# Patient Record
Sex: FEMALE | Race: BLACK OR AFRICAN AMERICAN | NOT HISPANIC OR LATINO | URBAN - METROPOLITAN AREA
[De-identification: names, ages, dates, MRNs, and addresses within clinical notes are randomized per-mention and may not be internally consistent; named-entity substitution may affect disease eponyms.]

---

## 2017-01-01 ENCOUNTER — EMERGENCY (EMERGENCY)
Facility: HOSPITAL | Age: 82
LOS: 1 days | Discharge: HOME | End: 2017-01-01
Attending: EMERGENCY MEDICINE
Payer: MEDICARE

## 2017-01-01 ENCOUNTER — INPATIENT (INPATIENT)
Facility: HOSPITAL | Age: 82
LOS: 5 days | DRG: 388 | End: 2017-07-25
Attending: INTERNAL MEDICINE | Admitting: INTERNAL MEDICINE
Payer: MEDICARE

## 2017-01-01 VITALS
RESPIRATION RATE: 21 BRPM | HEART RATE: 62 BPM | DIASTOLIC BLOOD PRESSURE: 44 MMHG | OXYGEN SATURATION: 97 % | SYSTOLIC BLOOD PRESSURE: 107 MMHG

## 2017-01-01 VITALS
DIASTOLIC BLOOD PRESSURE: 50 MMHG | SYSTOLIC BLOOD PRESSURE: 80 MMHG | TEMPERATURE: 98 F | RESPIRATION RATE: 20 BRPM | OXYGEN SATURATION: 99 % | WEIGHT: 110.01 LBS | HEART RATE: 89 BPM

## 2017-01-01 VITALS
TEMPERATURE: 101 F | HEART RATE: 115 BPM | DIASTOLIC BLOOD PRESSURE: 59 MMHG | SYSTOLIC BLOOD PRESSURE: 91 MMHG | RESPIRATION RATE: 44 BRPM | OXYGEN SATURATION: 94 %

## 2017-01-01 VITALS
TEMPERATURE: 98 F | OXYGEN SATURATION: 95 % | RESPIRATION RATE: 16 BRPM | SYSTOLIC BLOOD PRESSURE: 200 MMHG | HEART RATE: 103 BPM | DIASTOLIC BLOOD PRESSURE: 102 MMHG | WEIGHT: 110.01 LBS | HEIGHT: 64 IN

## 2017-01-01 DIAGNOSIS — N17.9 ACUTE KIDNEY FAILURE, UNSPECIFIED: ICD-10-CM

## 2017-01-01 DIAGNOSIS — F03.90 UNSPECIFIED DEMENTIA, UNSPECIFIED SEVERITY, WITHOUT BEHAVIORAL DISTURBANCE, PSYCHOTIC DISTURBANCE, MOOD DISTURBANCE, AND ANXIETY: ICD-10-CM

## 2017-01-01 DIAGNOSIS — K31.9 DISEASE OF STOMACH AND DUODENUM, UNSPECIFIED: ICD-10-CM

## 2017-01-01 DIAGNOSIS — R11.10 VOMITING, UNSPECIFIED: ICD-10-CM

## 2017-01-01 DIAGNOSIS — S09.90XA UNSPECIFIED INJURY OF HEAD, INITIAL ENCOUNTER: ICD-10-CM
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
## 2017-01-01 DIAGNOSIS — I10 ESSENTIAL (PRIMARY) HYPERTENSION: ICD-10-CM

## 2017-01-01 DIAGNOSIS — K56.41 FECAL IMPACTION: ICD-10-CM
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
## 2017-01-01 DIAGNOSIS — Z29.9 ENCOUNTER FOR PROPHYLACTIC MEASURES, UNSPECIFIED: ICD-10-CM

## 2017-01-01 DIAGNOSIS — Z86.79 PERSONAL HISTORY OF OTHER DISEASES OF THE CIRCULATORY SYSTEM: ICD-10-CM

## 2017-01-01 DIAGNOSIS — K56.7 ILEUS, UNSPECIFIED: ICD-10-CM

## 2017-01-01 DIAGNOSIS — R53.1 WEAKNESS: ICD-10-CM

## 2017-01-01 DIAGNOSIS — K56.69 OTHER INTESTINAL OBSTRUCTION: ICD-10-CM

## 2017-01-01 DIAGNOSIS — E86.0 DEHYDRATION: ICD-10-CM

## 2017-01-01 LAB
-  AMIKACIN: SIGNIFICANT CHANGE UP
-  AMPICILLIN/SULBACTAM: SIGNIFICANT CHANGE UP
-  AMPICILLIN: SIGNIFICANT CHANGE UP
-  AZTREONAM: SIGNIFICANT CHANGE UP
-  CEFAZOLIN: SIGNIFICANT CHANGE UP
-  CEFEPIME: SIGNIFICANT CHANGE UP
-  CEFOXITIN: SIGNIFICANT CHANGE UP
-  CEFTAZIDIME: SIGNIFICANT CHANGE UP
-  CEFTRIAXONE: SIGNIFICANT CHANGE UP
-  CIPROFLOXACIN: SIGNIFICANT CHANGE UP
-  ERTAPENEM: SIGNIFICANT CHANGE UP
-  GENTAMICIN: SIGNIFICANT CHANGE UP
-  IMIPENEM: SIGNIFICANT CHANGE UP
-  LEVOFLOXACIN: SIGNIFICANT CHANGE UP
-  MEROPENEM: SIGNIFICANT CHANGE UP
-  NITROFURANTOIN: SIGNIFICANT CHANGE UP
-  PIPERACILLIN/TAZOBACTAM: SIGNIFICANT CHANGE UP
-  TOBRAMYCIN: SIGNIFICANT CHANGE UP
-  TRIMETHOPRIM/SULFAMETHOXAZOLE: SIGNIFICANT CHANGE UP
24R-OH-CALCIDIOL SERPL-MCNC: 19 NG/ML — LOW (ref 30–100)
ALBUMIN SERPL ELPH-MCNC: 2.2 G/DL — LOW (ref 3.5–5)
ALBUMIN SERPL ELPH-MCNC: 2.6 G/DL — LOW (ref 3.5–5)
ALBUMIN SERPL ELPH-MCNC: 3.2 G/DL — LOW (ref 3.5–5)
ALP SERPL-CCNC: 105 U/L — SIGNIFICANT CHANGE UP (ref 40–120)
ALP SERPL-CCNC: 127 U/L — HIGH (ref 40–120)
ALP SERPL-CCNC: 85 U/L — SIGNIFICANT CHANGE UP (ref 40–120)
ALT FLD-CCNC: 21 U/L DA — SIGNIFICANT CHANGE UP (ref 10–60)
ALT FLD-CCNC: 21 U/L DA — SIGNIFICANT CHANGE UP (ref 10–60)
ALT FLD-CCNC: 24 U/L DA — SIGNIFICANT CHANGE UP (ref 10–60)
ANION GAP SERPL CALC-SCNC: 10 MMOL/L — SIGNIFICANT CHANGE UP (ref 5–17)
ANION GAP SERPL CALC-SCNC: 10 MMOL/L — SIGNIFICANT CHANGE UP (ref 5–17)
ANION GAP SERPL CALC-SCNC: 13 MMOL/L — SIGNIFICANT CHANGE UP (ref 5–17)
ANION GAP SERPL CALC-SCNC: 6 MMOL/L — SIGNIFICANT CHANGE UP (ref 5–17)
ANION GAP SERPL CALC-SCNC: 7 MMOL/L — SIGNIFICANT CHANGE UP (ref 5–17)
ANION GAP SERPL CALC-SCNC: 8 MMOL/L — SIGNIFICANT CHANGE UP (ref 5–17)
ANION GAP SERPL CALC-SCNC: 9 MMOL/L — SIGNIFICANT CHANGE UP (ref 5–17)
APPEARANCE UR: CLEAR — SIGNIFICANT CHANGE UP
APPEARANCE UR: CLEAR — SIGNIFICANT CHANGE UP
APTT BLD: 29.6 SEC — SIGNIFICANT CHANGE UP (ref 27.5–37.4)
APTT BLD: 36.6 SEC — SIGNIFICANT CHANGE UP (ref 27.5–37.4)
AST SERPL-CCNC: 19 U/L — SIGNIFICANT CHANGE UP (ref 10–40)
AST SERPL-CCNC: 20 U/L — SIGNIFICANT CHANGE UP (ref 10–40)
AST SERPL-CCNC: 32 U/L — SIGNIFICANT CHANGE UP (ref 10–40)
BASE EXCESS BLDA CALC-SCNC: -1 MMOL/L — SIGNIFICANT CHANGE UP (ref -2–2)
BASOPHILS # BLD AUTO: 0.1 K/UL — SIGNIFICANT CHANGE UP (ref 0–0.2)
BASOPHILS # BLD AUTO: 0.1 K/UL — SIGNIFICANT CHANGE UP (ref 0–0.2)
BASOPHILS NFR BLD AUTO: 0.6 % — SIGNIFICANT CHANGE UP (ref 0–2)
BASOPHILS NFR BLD AUTO: 2.9 % — HIGH (ref 0–2)
BILIRUB SERPL-MCNC: 0.3 MG/DL — SIGNIFICANT CHANGE UP (ref 0.2–1.2)
BILIRUB SERPL-MCNC: 0.3 MG/DL — SIGNIFICANT CHANGE UP (ref 0.2–1.2)
BILIRUB SERPL-MCNC: 1.1 MG/DL — SIGNIFICANT CHANGE UP (ref 0.2–1.2)
BILIRUB UR-MCNC: NEGATIVE — SIGNIFICANT CHANGE UP
BILIRUB UR-MCNC: NEGATIVE — SIGNIFICANT CHANGE UP
BUN SERPL-MCNC: 10 MG/DL — SIGNIFICANT CHANGE UP (ref 7–18)
BUN SERPL-MCNC: 20 MG/DL — HIGH (ref 7–18)
BUN SERPL-MCNC: 27 MG/DL — HIGH (ref 7–18)
BUN SERPL-MCNC: 28 MG/DL — HIGH (ref 7–18)
BUN SERPL-MCNC: 31 MG/DL — HIGH (ref 7–18)
BUN SERPL-MCNC: 34 MG/DL — HIGH (ref 7–18)
BUN SERPL-MCNC: 45 MG/DL — HIGH (ref 7–18)
CALCIUM SERPL-MCNC: 10.3 MG/DL — SIGNIFICANT CHANGE UP (ref 8.4–10.5)
CALCIUM SERPL-MCNC: 8.8 MG/DL — SIGNIFICANT CHANGE UP (ref 8.4–10.5)
CALCIUM SERPL-MCNC: 9 MG/DL — SIGNIFICANT CHANGE UP (ref 8.4–10.5)
CALCIUM SERPL-MCNC: 9 MG/DL — SIGNIFICANT CHANGE UP (ref 8.4–10.5)
CALCIUM SERPL-MCNC: 9.1 MG/DL — SIGNIFICANT CHANGE UP (ref 8.4–10.5)
CALCIUM SERPL-MCNC: 9.2 MG/DL — SIGNIFICANT CHANGE UP (ref 8.4–10.5)
CALCIUM SERPL-MCNC: 9.4 MG/DL — SIGNIFICANT CHANGE UP (ref 8.4–10.5)
CHLORIDE SERPL-SCNC: 102 MMOL/L — SIGNIFICANT CHANGE UP (ref 96–108)
CHLORIDE SERPL-SCNC: 108 MMOL/L — SIGNIFICANT CHANGE UP (ref 96–108)
CHLORIDE SERPL-SCNC: 110 MMOL/L — HIGH (ref 96–108)
CHLORIDE SERPL-SCNC: 115 MMOL/L — HIGH (ref 96–108)
CHLORIDE SERPL-SCNC: 117 MMOL/L — HIGH (ref 96–108)
CHLORIDE SERPL-SCNC: 118 MMOL/L — HIGH (ref 96–108)
CHLORIDE SERPL-SCNC: 118 MMOL/L — HIGH (ref 96–108)
CHOLEST SERPL-MCNC: 155 MG/DL — SIGNIFICANT CHANGE UP (ref 10–199)
CO2 SERPL-SCNC: 21 MMOL/L — LOW (ref 22–31)
CO2 SERPL-SCNC: 22 MMOL/L — SIGNIFICANT CHANGE UP (ref 22–31)
CO2 SERPL-SCNC: 22 MMOL/L — SIGNIFICANT CHANGE UP (ref 22–31)
CO2 SERPL-SCNC: 23 MMOL/L — SIGNIFICANT CHANGE UP (ref 22–31)
CO2 SERPL-SCNC: 24 MMOL/L — SIGNIFICANT CHANGE UP (ref 22–31)
CO2 SERPL-SCNC: 25 MMOL/L — SIGNIFICANT CHANGE UP (ref 22–31)
CO2 SERPL-SCNC: 28 MMOL/L — SIGNIFICANT CHANGE UP (ref 22–31)
COLOR SPEC: YELLOW — SIGNIFICANT CHANGE UP
COLOR SPEC: YELLOW — SIGNIFICANT CHANGE UP
CREAT SERPL-MCNC: 0.7 MG/DL — SIGNIFICANT CHANGE UP (ref 0.5–1.3)
CREAT SERPL-MCNC: 0.82 MG/DL — SIGNIFICANT CHANGE UP (ref 0.5–1.3)
CREAT SERPL-MCNC: 0.86 MG/DL — SIGNIFICANT CHANGE UP (ref 0.5–1.3)
CREAT SERPL-MCNC: 1.25 MG/DL — SIGNIFICANT CHANGE UP (ref 0.5–1.3)
CREAT SERPL-MCNC: 1.27 MG/DL — SIGNIFICANT CHANGE UP (ref 0.5–1.3)
CREAT SERPL-MCNC: 1.55 MG/DL — HIGH (ref 0.5–1.3)
CREAT SERPL-MCNC: 2.4 MG/DL — HIGH (ref 0.5–1.3)
CULTURE RESULTS: SIGNIFICANT CHANGE UP
D DIMER BLD IA.RAPID-MCNC: HIGH NG/ML DDU
DIFF PNL FLD: ABNORMAL
DIFF PNL FLD: NEGATIVE — SIGNIFICANT CHANGE UP
EOSINOPHIL # BLD AUTO: 0 K/UL — SIGNIFICANT CHANGE UP (ref 0–0.5)
EOSINOPHIL # BLD AUTO: 0.3 K/UL — SIGNIFICANT CHANGE UP (ref 0–0.5)
EOSINOPHIL NFR BLD AUTO: 0 % — SIGNIFICANT CHANGE UP (ref 0–6)
EOSINOPHIL NFR BLD AUTO: 5.3 % — SIGNIFICANT CHANGE UP (ref 0–6)
FOLATE SERPL-MCNC: 7.1 NG/ML — SIGNIFICANT CHANGE UP (ref 4.8–24.2)
GLUCOSE SERPL-MCNC: 103 MG/DL — HIGH (ref 70–99)
GLUCOSE SERPL-MCNC: 123 MG/DL — HIGH (ref 70–99)
GLUCOSE SERPL-MCNC: 129 MG/DL — HIGH (ref 70–99)
GLUCOSE SERPL-MCNC: 133 MG/DL — HIGH (ref 70–99)
GLUCOSE SERPL-MCNC: 171 MG/DL — HIGH (ref 70–99)
GLUCOSE SERPL-MCNC: 239 MG/DL — HIGH (ref 70–99)
GLUCOSE SERPL-MCNC: 85 MG/DL — SIGNIFICANT CHANGE UP (ref 70–99)
GLUCOSE UR QL: NEGATIVE — SIGNIFICANT CHANGE UP
GLUCOSE UR QL: NEGATIVE — SIGNIFICANT CHANGE UP
HBA1C BLD-MCNC: 5.6 % — SIGNIFICANT CHANGE UP (ref 4–5.6)
HCO3 BLDA-SCNC: 21 MMOL/L — LOW (ref 23–27)
HCT VFR BLD CALC: 41 % — SIGNIFICANT CHANGE UP (ref 34.5–45)
HCT VFR BLD CALC: 41.4 % — SIGNIFICANT CHANGE UP (ref 34.5–45)
HCT VFR BLD CALC: 41.6 % — SIGNIFICANT CHANGE UP (ref 34.5–45)
HCT VFR BLD CALC: 43.2 % — SIGNIFICANT CHANGE UP (ref 34.5–45)
HCT VFR BLD CALC: 44.6 % — SIGNIFICANT CHANGE UP (ref 34.5–45)
HCT VFR BLD CALC: 46.3 % — HIGH (ref 34.5–45)
HCT VFR BLD CALC: 46.6 % — HIGH (ref 34.5–45)
HCT VFR BLD CALC: 48.9 % — HIGH (ref 34.5–45)
HCT VFR BLD CALC: 52.3 % — HIGH (ref 34.5–45)
HDLC SERPL-MCNC: 73 MG/DL — SIGNIFICANT CHANGE UP (ref 40–125)
HGB BLD-MCNC: 13.2 G/DL — SIGNIFICANT CHANGE UP (ref 11.5–15.5)
HGB BLD-MCNC: 13.4 G/DL — SIGNIFICANT CHANGE UP (ref 11.5–15.5)
HGB BLD-MCNC: 13.6 G/DL — SIGNIFICANT CHANGE UP (ref 11.5–15.5)
HGB BLD-MCNC: 14 G/DL — SIGNIFICANT CHANGE UP (ref 11.5–15.5)
HGB BLD-MCNC: 14.6 G/DL — SIGNIFICANT CHANGE UP (ref 11.5–15.5)
HGB BLD-MCNC: 14.8 G/DL — SIGNIFICANT CHANGE UP (ref 11.5–15.5)
HGB BLD-MCNC: 15.2 G/DL — SIGNIFICANT CHANGE UP (ref 11.5–15.5)
HGB BLD-MCNC: 15.3 G/DL — SIGNIFICANT CHANGE UP (ref 11.5–15.5)
HGB BLD-MCNC: 16.3 G/DL — HIGH (ref 11.5–15.5)
HOROWITZ INDEX BLDA+IHG-RTO: SIGNIFICANT CHANGE UP
INR BLD: 0.98 RATIO — SIGNIFICANT CHANGE UP (ref 0.88–1.16)
INR BLD: 1.11 RATIO — SIGNIFICANT CHANGE UP (ref 0.88–1.16)
KETONES UR-MCNC: NEGATIVE — SIGNIFICANT CHANGE UP
KETONES UR-MCNC: NEGATIVE — SIGNIFICANT CHANGE UP
LACTATE SERPL-SCNC: 1.5 MMOL/L — SIGNIFICANT CHANGE UP (ref 0.7–2)
LACTATE SERPL-SCNC: 2.1 MMOL/L — HIGH (ref 0.7–2)
LACTATE SERPL-SCNC: 3.3 MMOL/L — HIGH (ref 0.7–2)
LACTATE SERPL-SCNC: 3.9 MMOL/L — HIGH (ref 0.7–2)
LACTATE SERPL-SCNC: 4.8 MMOL/L — CRITICAL HIGH (ref 0.7–2)
LEUKOCYTE ESTERASE UR-ACNC: ABNORMAL
LEUKOCYTE ESTERASE UR-ACNC: NEGATIVE — SIGNIFICANT CHANGE UP
LIPID PNL WITH DIRECT LDL SERPL: 69 MG/DL — SIGNIFICANT CHANGE UP
LYMPHOCYTES # BLD AUTO: 0.9 K/UL — LOW (ref 1–3.3)
LYMPHOCYTES # BLD AUTO: 2 K/UL — SIGNIFICANT CHANGE UP (ref 1–3.3)
LYMPHOCYTES # BLD AUTO: 38.9 % — SIGNIFICANT CHANGE UP (ref 13–44)
LYMPHOCYTES # BLD AUTO: 6.2 % — LOW (ref 13–44)
MAGNESIUM SERPL-MCNC: 2.4 MG/DL — SIGNIFICANT CHANGE UP (ref 1.6–2.6)
MAGNESIUM SERPL-MCNC: 2.4 MG/DL — SIGNIFICANT CHANGE UP (ref 1.6–2.6)
MCHC RBC-ENTMCNC: 28.3 PG — SIGNIFICANT CHANGE UP (ref 27–34)
MCHC RBC-ENTMCNC: 28.3 PG — SIGNIFICANT CHANGE UP (ref 27–34)
MCHC RBC-ENTMCNC: 28.5 PG — SIGNIFICANT CHANGE UP (ref 27–34)
MCHC RBC-ENTMCNC: 28.6 PG — SIGNIFICANT CHANGE UP (ref 27–34)
MCHC RBC-ENTMCNC: 28.7 PG — SIGNIFICANT CHANGE UP (ref 27–34)
MCHC RBC-ENTMCNC: 28.7 PG — SIGNIFICANT CHANGE UP (ref 27–34)
MCHC RBC-ENTMCNC: 28.8 PG — SIGNIFICANT CHANGE UP (ref 27–34)
MCHC RBC-ENTMCNC: 28.9 PG — SIGNIFICANT CHANGE UP (ref 27–34)
MCHC RBC-ENTMCNC: 29.1 PG — SIGNIFICANT CHANGE UP (ref 27–34)
MCHC RBC-ENTMCNC: 31.2 GM/DL — LOW (ref 32–36)
MCHC RBC-ENTMCNC: 31.3 GM/DL — LOW (ref 32–36)
MCHC RBC-ENTMCNC: 31.9 GM/DL — LOW (ref 32–36)
MCHC RBC-ENTMCNC: 32 GM/DL — SIGNIFICANT CHANGE UP (ref 32–36)
MCHC RBC-ENTMCNC: 32.2 GM/DL — SIGNIFICANT CHANGE UP (ref 32–36)
MCHC RBC-ENTMCNC: 32.5 GM/DL — SIGNIFICANT CHANGE UP (ref 32–36)
MCHC RBC-ENTMCNC: 32.6 GM/DL — SIGNIFICANT CHANGE UP (ref 32–36)
MCHC RBC-ENTMCNC: 32.8 GM/DL — SIGNIFICANT CHANGE UP (ref 32–36)
MCHC RBC-ENTMCNC: 33.1 GM/DL — SIGNIFICANT CHANGE UP (ref 32–36)
MCV RBC AUTO: 88 FL — SIGNIFICANT CHANGE UP (ref 80–100)
MCV RBC AUTO: 88.1 FL — SIGNIFICANT CHANGE UP (ref 80–100)
MCV RBC AUTO: 88.2 FL — SIGNIFICANT CHANGE UP (ref 80–100)
MCV RBC AUTO: 88.2 FL — SIGNIFICANT CHANGE UP (ref 80–100)
MCV RBC AUTO: 88.8 FL — SIGNIFICANT CHANGE UP (ref 80–100)
MCV RBC AUTO: 89.3 FL — SIGNIFICANT CHANGE UP (ref 80–100)
MCV RBC AUTO: 89.6 FL — SIGNIFICANT CHANGE UP (ref 80–100)
MCV RBC AUTO: 90.4 FL — SIGNIFICANT CHANGE UP (ref 80–100)
MCV RBC AUTO: 90.5 FL — SIGNIFICANT CHANGE UP (ref 80–100)
METHOD TYPE: SIGNIFICANT CHANGE UP
MONOCYTES # BLD AUTO: 0.6 K/UL — SIGNIFICANT CHANGE UP (ref 0–0.9)
MONOCYTES # BLD AUTO: 1.2 K/UL — HIGH (ref 0–0.9)
MONOCYTES NFR BLD AUTO: 12.2 % — SIGNIFICANT CHANGE UP (ref 2–14)
MONOCYTES NFR BLD AUTO: 8.2 % — SIGNIFICANT CHANGE UP (ref 2–14)
NEUTROPHILS # BLD AUTO: 12.4 K/UL — HIGH (ref 1.8–7.4)
NEUTROPHILS # BLD AUTO: 2.1 K/UL — SIGNIFICANT CHANGE UP (ref 1.8–7.4)
NEUTROPHILS NFR BLD AUTO: 40.8 % — LOW (ref 43–77)
NEUTROPHILS NFR BLD AUTO: 85.1 % — HIGH (ref 43–77)
NITRITE UR-MCNC: NEGATIVE — SIGNIFICANT CHANGE UP
NITRITE UR-MCNC: POSITIVE
OB PNL STL: NEGATIVE — SIGNIFICANT CHANGE UP
ORGANISM # SPEC MICROSCOPIC CNT: SIGNIFICANT CHANGE UP
ORGANISM # SPEC MICROSCOPIC CNT: SIGNIFICANT CHANGE UP
PCO2 BLDA: 28 MMHG — LOW (ref 32–46)
PH BLDA: 7.49 — HIGH (ref 7.35–7.45)
PH UR: 5 — SIGNIFICANT CHANGE UP (ref 5–8)
PH UR: 8 — SIGNIFICANT CHANGE UP (ref 4.8–8)
PHOSPHATE SERPL-MCNC: 2.5 MG/DL — SIGNIFICANT CHANGE UP (ref 2.5–4.5)
PHOSPHATE SERPL-MCNC: 2.7 MG/DL — SIGNIFICANT CHANGE UP (ref 2.5–4.5)
PLATELET # BLD AUTO: 125 K/UL — LOW (ref 150–400)
PLATELET # BLD AUTO: 131 K/UL — LOW (ref 150–400)
PLATELET # BLD AUTO: 144 K/UL — LOW (ref 150–400)
PLATELET # BLD AUTO: 148 K/UL — LOW (ref 150–400)
PLATELET # BLD AUTO: 150 K/UL — SIGNIFICANT CHANGE UP (ref 150–400)
PLATELET # BLD AUTO: 156 K/UL — SIGNIFICANT CHANGE UP (ref 150–400)
PLATELET # BLD AUTO: 161 K/UL — SIGNIFICANT CHANGE UP (ref 150–400)
PLATELET # BLD AUTO: 166 K/UL — SIGNIFICANT CHANGE UP (ref 150–400)
PLATELET # BLD AUTO: 170 K/UL — SIGNIFICANT CHANGE UP (ref 150–400)
PO2 BLDA: 82 MMHG — SIGNIFICANT CHANGE UP (ref 74–108)
POTASSIUM SERPL-MCNC: 3.6 MMOL/L — SIGNIFICANT CHANGE UP (ref 3.5–5.3)
POTASSIUM SERPL-MCNC: 4.1 MMOL/L — SIGNIFICANT CHANGE UP (ref 3.5–5.3)
POTASSIUM SERPL-MCNC: 4.2 MMOL/L — SIGNIFICANT CHANGE UP (ref 3.5–5.3)
POTASSIUM SERPL-MCNC: 4.4 MMOL/L — SIGNIFICANT CHANGE UP (ref 3.5–5.3)
POTASSIUM SERPL-MCNC: 4.5 MMOL/L — SIGNIFICANT CHANGE UP (ref 3.5–5.3)
POTASSIUM SERPL-MCNC: 4.9 MMOL/L — SIGNIFICANT CHANGE UP (ref 3.5–5.3)
POTASSIUM SERPL-MCNC: 4.9 MMOL/L — SIGNIFICANT CHANGE UP (ref 3.5–5.3)
POTASSIUM SERPL-SCNC: 3.6 MMOL/L — SIGNIFICANT CHANGE UP (ref 3.5–5.3)
POTASSIUM SERPL-SCNC: 4.1 MMOL/L — SIGNIFICANT CHANGE UP (ref 3.5–5.3)
POTASSIUM SERPL-SCNC: 4.2 MMOL/L — SIGNIFICANT CHANGE UP (ref 3.5–5.3)
POTASSIUM SERPL-SCNC: 4.4 MMOL/L — SIGNIFICANT CHANGE UP (ref 3.5–5.3)
POTASSIUM SERPL-SCNC: 4.5 MMOL/L — SIGNIFICANT CHANGE UP (ref 3.5–5.3)
POTASSIUM SERPL-SCNC: 4.9 MMOL/L — SIGNIFICANT CHANGE UP (ref 3.5–5.3)
POTASSIUM SERPL-SCNC: 4.9 MMOL/L — SIGNIFICANT CHANGE UP (ref 3.5–5.3)
PROT SERPL-MCNC: 6.8 G/DL — SIGNIFICANT CHANGE UP (ref 6–8.3)
PROT SERPL-MCNC: 7.2 G/DL — SIGNIFICANT CHANGE UP (ref 6–8.3)
PROT SERPL-MCNC: 8.5 G/DL — HIGH (ref 6–8.3)
PROT UR-MCNC: 30 MG/DL
PROT UR-MCNC: NEGATIVE — SIGNIFICANT CHANGE UP
PROTHROM AB SERPL-ACNC: 10.7 SEC — SIGNIFICANT CHANGE UP (ref 9.8–12.7)
PROTHROM AB SERPL-ACNC: 12.1 SEC — SIGNIFICANT CHANGE UP (ref 9.8–12.7)
RBC # BLD: 4.61 M/UL — SIGNIFICANT CHANGE UP (ref 3.8–5.2)
RBC # BLD: 4.66 M/UL — SIGNIFICANT CHANGE UP (ref 3.8–5.2)
RBC # BLD: 4.66 M/UL — SIGNIFICANT CHANGE UP (ref 3.8–5.2)
RBC # BLD: 4.9 M/UL — SIGNIFICANT CHANGE UP (ref 3.8–5.2)
RBC # BLD: 5.06 M/UL — SIGNIFICANT CHANGE UP (ref 3.8–5.2)
RBC # BLD: 5.21 M/UL — HIGH (ref 3.8–5.2)
RBC # BLD: 5.28 M/UL — HIGH (ref 3.8–5.2)
RBC # BLD: 5.41 M/UL — HIGH (ref 3.8–5.2)
RBC # BLD: 5.78 M/UL — HIGH (ref 3.8–5.2)
RBC # FLD: 13.4 % — SIGNIFICANT CHANGE UP (ref 10.3–14.5)
RBC # FLD: 14.3 % — SIGNIFICANT CHANGE UP (ref 10.3–14.5)
RBC # FLD: 14.5 % — SIGNIFICANT CHANGE UP (ref 10.3–14.5)
RBC # FLD: 14.5 % — SIGNIFICANT CHANGE UP (ref 10.3–14.5)
RBC # FLD: 14.6 % — HIGH (ref 10.3–14.5)
RBC # FLD: 14.6 % — HIGH (ref 10.3–14.5)
RBC # FLD: 14.7 % — HIGH (ref 10.3–14.5)
RBC # FLD: 14.8 % — HIGH (ref 10.3–14.5)
RBC # FLD: 14.9 % — HIGH (ref 10.3–14.5)
SAO2 % BLDA: 96 % — SIGNIFICANT CHANGE UP (ref 92–96)
SODIUM SERPL-SCNC: 139 MMOL/L — SIGNIFICANT CHANGE UP (ref 135–145)
SODIUM SERPL-SCNC: 142 MMOL/L — SIGNIFICANT CHANGE UP (ref 135–145)
SODIUM SERPL-SCNC: 142 MMOL/L — SIGNIFICANT CHANGE UP (ref 135–145)
SODIUM SERPL-SCNC: 145 MMOL/L — SIGNIFICANT CHANGE UP (ref 135–145)
SODIUM SERPL-SCNC: 148 MMOL/L — HIGH (ref 135–145)
SODIUM SERPL-SCNC: 149 MMOL/L — HIGH (ref 135–145)
SODIUM SERPL-SCNC: 151 MMOL/L — HIGH (ref 135–145)
SP GR SPEC: 1.01 — SIGNIFICANT CHANGE UP (ref 1.01–1.02)
SP GR SPEC: 1.02 — SIGNIFICANT CHANGE UP (ref 1.01–1.02)
SPECIMEN SOURCE: SIGNIFICANT CHANGE UP
TOTAL CHOLESTEROL/HDL RATIO MEASUREMENT: 2.1 RATIO — LOW (ref 3.3–7.1)
TRIGL SERPL-MCNC: 63 MG/DL — SIGNIFICANT CHANGE UP (ref 10–149)
TSH SERPL-MCNC: 1.29 UU/ML — SIGNIFICANT CHANGE UP (ref 0.34–4.82)
UROBILINOGEN FLD QL: 1
UROBILINOGEN FLD QL: NEGATIVE — SIGNIFICANT CHANGE UP
VIT B12 SERPL-MCNC: >2000 PG/ML — HIGH (ref 243–894)
WBC # BLD: 13 K/UL — HIGH (ref 3.8–10.5)
WBC # BLD: 14.4 K/UL — HIGH (ref 3.8–10.5)
WBC # BLD: 14.6 K/UL — HIGH (ref 3.8–10.5)
WBC # BLD: 19.5 K/UL — HIGH (ref 3.8–10.5)
WBC # BLD: 5.1 K/UL — SIGNIFICANT CHANGE UP (ref 3.8–10.5)
WBC # BLD: 5.8 K/UL — SIGNIFICANT CHANGE UP (ref 3.8–10.5)
WBC # BLD: 7.3 K/UL — SIGNIFICANT CHANGE UP (ref 3.8–10.5)
WBC # BLD: 8.5 K/UL — SIGNIFICANT CHANGE UP (ref 3.8–10.5)
WBC # BLD: 9.9 K/UL — SIGNIFICANT CHANGE UP (ref 3.8–10.5)
WBC # FLD AUTO: 13 K/UL — HIGH (ref 3.8–10.5)
WBC # FLD AUTO: 14.4 K/UL — HIGH (ref 3.8–10.5)
WBC # FLD AUTO: 14.6 K/UL — HIGH (ref 3.8–10.5)
WBC # FLD AUTO: 19.5 K/UL — HIGH (ref 3.8–10.5)
WBC # FLD AUTO: 5.1 K/UL — SIGNIFICANT CHANGE UP (ref 3.8–10.5)
WBC # FLD AUTO: 5.8 K/UL — SIGNIFICANT CHANGE UP (ref 3.8–10.5)
WBC # FLD AUTO: 7.3 K/UL — SIGNIFICANT CHANGE UP (ref 3.8–10.5)
WBC # FLD AUTO: 8.5 K/UL — SIGNIFICANT CHANGE UP (ref 3.8–10.5)
WBC # FLD AUTO: 9.9 K/UL — SIGNIFICANT CHANGE UP (ref 3.8–10.5)

## 2017-01-01 PROCEDURE — 74020: CPT

## 2017-01-01 PROCEDURE — 74020: CPT | Mod: 26,77

## 2017-01-01 PROCEDURE — 72125 CT NECK SPINE W/O DYE: CPT

## 2017-01-01 PROCEDURE — 99285 EMERGENCY DEPT VISIT HI MDM: CPT

## 2017-01-01 PROCEDURE — 99285 EMERGENCY DEPT VISIT HI MDM: CPT | Mod: 25

## 2017-01-01 PROCEDURE — 36000 PLACE NEEDLE IN VEIN: CPT

## 2017-01-01 PROCEDURE — 83605 ASSAY OF LACTIC ACID: CPT

## 2017-01-01 PROCEDURE — 87086 URINE CULTURE/COLONY COUNT: CPT

## 2017-01-01 PROCEDURE — 80048 BASIC METABOLIC PNL TOTAL CA: CPT

## 2017-01-01 PROCEDURE — 99284 EMERGENCY DEPT VISIT MOD MDM: CPT

## 2017-01-01 PROCEDURE — 76857 US EXAM PELVIC LIMITED: CPT

## 2017-01-01 PROCEDURE — 71010: CPT | Mod: 26

## 2017-01-01 PROCEDURE — 82306 VITAMIN D 25 HYDROXY: CPT

## 2017-01-01 PROCEDURE — 80061 LIPID PANEL: CPT

## 2017-01-01 PROCEDURE — 82607 VITAMIN B-12: CPT

## 2017-01-01 PROCEDURE — 85027 COMPLETE CBC AUTOMATED: CPT

## 2017-01-01 PROCEDURE — 74018 RADEX ABDOMEN 1 VIEW: CPT

## 2017-01-01 PROCEDURE — 82803 BLOOD GASES ANY COMBINATION: CPT

## 2017-01-01 PROCEDURE — 92610 EVALUATE SWALLOWING FUNCTION: CPT

## 2017-01-01 PROCEDURE — 70450 CT HEAD/BRAIN W/O DYE: CPT | Mod: 26

## 2017-01-01 PROCEDURE — 81003 URINALYSIS AUTO W/O SCOPE: CPT

## 2017-01-01 PROCEDURE — 74176 CT ABD & PELVIS W/O CONTRAST: CPT

## 2017-01-01 PROCEDURE — 80053 COMPREHEN METABOLIC PANEL: CPT

## 2017-01-01 PROCEDURE — 87186 SC STD MICRODIL/AGAR DIL: CPT

## 2017-01-01 PROCEDURE — 86900 BLOOD TYPING SEROLOGIC ABO: CPT

## 2017-01-01 PROCEDURE — 93005 ELECTROCARDIOGRAM TRACING: CPT

## 2017-01-01 PROCEDURE — 87040 BLOOD CULTURE FOR BACTERIA: CPT

## 2017-01-01 PROCEDURE — 86901 BLOOD TYPING SEROLOGIC RH(D): CPT

## 2017-01-01 PROCEDURE — 84100 ASSAY OF PHOSPHORUS: CPT

## 2017-01-01 PROCEDURE — 71045 X-RAY EXAM CHEST 1 VIEW: CPT

## 2017-01-01 PROCEDURE — 84443 ASSAY THYROID STIM HORMONE: CPT

## 2017-01-01 PROCEDURE — 36415 COLL VENOUS BLD VENIPUNCTURE: CPT

## 2017-01-01 PROCEDURE — 74020: CPT | Mod: 26

## 2017-01-01 PROCEDURE — 71250 CT THORAX DX C-: CPT

## 2017-01-01 PROCEDURE — 76775 US EXAM ABDO BACK WALL LIM: CPT | Mod: 26

## 2017-01-01 PROCEDURE — 71250 CT THORAX DX C-: CPT | Mod: 26

## 2017-01-01 PROCEDURE — 74000: CPT | Mod: 26

## 2017-01-01 PROCEDURE — 82272 OCCULT BLD FECES 1-3 TESTS: CPT

## 2017-01-01 PROCEDURE — 85730 THROMBOPLASTIN TIME PARTIAL: CPT

## 2017-01-01 PROCEDURE — 72125 CT NECK SPINE W/O DYE: CPT | Mod: 26

## 2017-01-01 PROCEDURE — 83735 ASSAY OF MAGNESIUM: CPT

## 2017-01-01 PROCEDURE — 76775 US EXAM ABDO BACK WALL LIM: CPT

## 2017-01-01 PROCEDURE — 85610 PROTHROMBIN TIME: CPT

## 2017-01-01 PROCEDURE — 81001 URINALYSIS AUTO W/SCOPE: CPT

## 2017-01-01 PROCEDURE — 94640 AIRWAY INHALATION TREATMENT: CPT

## 2017-01-01 PROCEDURE — 74176 CT ABD & PELVIS W/O CONTRAST: CPT | Mod: 26

## 2017-01-01 PROCEDURE — 83036 HEMOGLOBIN GLYCOSYLATED A1C: CPT

## 2017-01-01 PROCEDURE — 99222 1ST HOSP IP/OBS MODERATE 55: CPT

## 2017-01-01 PROCEDURE — 86850 RBC ANTIBODY SCREEN: CPT

## 2017-01-01 PROCEDURE — 71010: CPT | Mod: 26,77

## 2017-01-01 PROCEDURE — 70450 CT HEAD/BRAIN W/O DYE: CPT

## 2017-01-01 PROCEDURE — 85379 FIBRIN DEGRADATION QUANT: CPT

## 2017-01-01 PROCEDURE — 82746 ASSAY OF FOLIC ACID SERUM: CPT

## 2017-01-01 RX ORDER — SENNA PLUS 8.6 MG/1
0 TABLET ORAL
Qty: 0 | Refills: 0 | COMMUNITY

## 2017-01-01 RX ORDER — PIPERACILLIN AND TAZOBACTAM 4; .5 G/20ML; G/20ML
3.38 INJECTION, POWDER, LYOPHILIZED, FOR SOLUTION INTRAVENOUS ONCE
Qty: 0 | Refills: 0 | Status: COMPLETED | OUTPATIENT
Start: 2017-01-01 | End: 2017-01-01

## 2017-01-01 RX ORDER — DONEPEZIL HYDROCHLORIDE 10 MG/1
5 TABLET, FILM COATED ORAL AT BEDTIME
Qty: 0 | Refills: 0 | Status: DISCONTINUED | OUTPATIENT
Start: 2017-01-01 | End: 2017-01-01

## 2017-01-01 RX ORDER — METOPROLOL TARTRATE 50 MG
25 TABLET ORAL
Qty: 0 | Refills: 0 | Status: DISCONTINUED | OUTPATIENT
Start: 2017-01-01 | End: 2017-01-01

## 2017-01-01 RX ORDER — HEPARIN SODIUM 5000 [USP'U]/ML
5000 INJECTION INTRAVENOUS; SUBCUTANEOUS EVERY 8 HOURS
Qty: 0 | Refills: 0 | Status: DISCONTINUED | OUTPATIENT
Start: 2017-01-01 | End: 2017-01-01

## 2017-01-01 RX ORDER — PANTOPRAZOLE SODIUM 20 MG/1
40 TABLET, DELAYED RELEASE ORAL EVERY 12 HOURS
Qty: 0 | Refills: 0 | Status: DISCONTINUED | OUTPATIENT
Start: 2017-01-01 | End: 2017-01-01

## 2017-01-01 RX ORDER — METOCLOPRAMIDE HCL 10 MG
10 TABLET ORAL ONCE
Qty: 0 | Refills: 0 | Status: COMPLETED | OUTPATIENT
Start: 2017-01-01 | End: 2017-01-01

## 2017-01-01 RX ORDER — ACETAMINOPHEN 500 MG
650 TABLET ORAL EVERY 6 HOURS
Qty: 0 | Refills: 0 | Status: DISCONTINUED | OUTPATIENT
Start: 2017-01-01 | End: 2017-01-01

## 2017-01-01 RX ORDER — PANTOPRAZOLE SODIUM 20 MG/1
40 TABLET, DELAYED RELEASE ORAL
Qty: 0 | Refills: 0 | Status: DISCONTINUED | OUTPATIENT
Start: 2017-01-01 | End: 2017-01-01

## 2017-01-01 RX ORDER — METOPROLOL TARTRATE 50 MG
2.5 TABLET ORAL EVERY 12 HOURS
Qty: 0 | Refills: 0 | Status: DISCONTINUED | OUTPATIENT
Start: 2017-01-01 | End: 2017-01-01

## 2017-01-01 RX ORDER — SODIUM CHLORIDE 9 MG/ML
500 INJECTION INTRAMUSCULAR; INTRAVENOUS; SUBCUTANEOUS ONCE
Qty: 0 | Refills: 0 | Status: DISCONTINUED | OUTPATIENT
Start: 2017-01-01 | End: 2017-01-01

## 2017-01-01 RX ORDER — IPRATROPIUM/ALBUTEROL SULFATE 18-103MCG
3 AEROSOL WITH ADAPTER (GRAM) INHALATION EVERY 6 HOURS
Qty: 0 | Refills: 0 | Status: DISCONTINUED | OUTPATIENT
Start: 2017-01-01 | End: 2017-01-01

## 2017-01-01 RX ORDER — MINERAL OIL
133 OIL (ML) MISCELLANEOUS ONCE
Qty: 0 | Refills: 0 | Status: COMPLETED | OUTPATIENT
Start: 2017-01-01 | End: 2017-01-01

## 2017-01-01 RX ORDER — SODIUM CHLORIDE 9 MG/ML
1000 INJECTION, SOLUTION INTRAVENOUS
Qty: 0 | Refills: 0 | Status: DISCONTINUED | OUTPATIENT
Start: 2017-01-01 | End: 2017-01-01

## 2017-01-01 RX ORDER — VANCOMYCIN HCL 1 G
1000 VIAL (EA) INTRAVENOUS ONCE
Qty: 0 | Refills: 0 | Status: COMPLETED | OUTPATIENT
Start: 2017-01-01 | End: 2017-01-01

## 2017-01-01 RX ORDER — SODIUM CHLORIDE 9 MG/ML
500 INJECTION INTRAMUSCULAR; INTRAVENOUS; SUBCUTANEOUS ONCE
Qty: 0 | Refills: 0 | Status: COMPLETED | OUTPATIENT
Start: 2017-01-01 | End: 2017-01-01

## 2017-01-01 RX ORDER — MEMANTINE HYDROCHLORIDE 10 MG/1
10 TABLET ORAL DAILY
Qty: 0 | Refills: 0 | Status: DISCONTINUED | OUTPATIENT
Start: 2017-01-01 | End: 2017-01-01

## 2017-01-01 RX ORDER — METOPROLOL TARTRATE 50 MG
2.5 TABLET ORAL ONCE
Qty: 0 | Refills: 0 | Status: COMPLETED | OUTPATIENT
Start: 2017-01-01 | End: 2017-01-01

## 2017-01-01 RX ORDER — TIOTROPIUM BROMIDE 18 UG/1
1 CAPSULE ORAL; RESPIRATORY (INHALATION) DAILY
Qty: 0 | Refills: 0 | Status: DISCONTINUED | OUTPATIENT
Start: 2017-01-01 | End: 2017-01-01

## 2017-01-01 RX ORDER — SODIUM CHLORIDE 9 MG/ML
1000 INJECTION INTRAMUSCULAR; INTRAVENOUS; SUBCUTANEOUS ONCE
Qty: 0 | Refills: 0 | Status: COMPLETED | OUTPATIENT
Start: 2017-01-01 | End: 2017-01-01

## 2017-01-01 RX ORDER — PIPERACILLIN AND TAZOBACTAM 4; .5 G/20ML; G/20ML
3.38 INJECTION, POWDER, LYOPHILIZED, FOR SOLUTION INTRAVENOUS EVERY 8 HOURS
Qty: 0 | Refills: 0 | Status: DISCONTINUED | OUTPATIENT
Start: 2017-01-01 | End: 2017-01-01

## 2017-01-01 RX ORDER — MEMANTINE HYDROCHLORIDE 10 MG/1
0 TABLET ORAL
Qty: 0 | Refills: 0 | COMMUNITY

## 2017-01-01 RX ORDER — SODIUM CHLORIDE 9 MG/ML
1000 INJECTION INTRAMUSCULAR; INTRAVENOUS; SUBCUTANEOUS
Qty: 0 | Refills: 0 | Status: DISCONTINUED | OUTPATIENT
Start: 2017-01-01 | End: 2017-01-01

## 2017-01-01 RX ORDER — MAGNESIUM HYDROXIDE 400 MG/1
30 TABLET, CHEWABLE ORAL
Qty: 0 | Refills: 0 | COMMUNITY

## 2017-01-01 RX ORDER — METOPROLOL TARTRATE 50 MG
5 TABLET ORAL ONCE
Qty: 0 | Refills: 0 | Status: COMPLETED | OUTPATIENT
Start: 2017-01-01 | End: 2017-01-01

## 2017-01-01 RX ORDER — ALBUTEROL 90 UG/1
1 AEROSOL, METERED ORAL EVERY 4 HOURS
Qty: 0 | Refills: 0 | Status: DISCONTINUED | OUTPATIENT
Start: 2017-01-01 | End: 2017-01-01

## 2017-01-01 RX ORDER — PANTOPRAZOLE SODIUM 20 MG/1
8 TABLET, DELAYED RELEASE ORAL
Qty: 80 | Refills: 0 | Status: DISCONTINUED | OUTPATIENT
Start: 2017-01-01 | End: 2017-01-01

## 2017-01-01 RX ORDER — METOPROLOL TARTRATE 50 MG
1 TABLET ORAL
Qty: 0 | Refills: 0 | COMMUNITY

## 2017-01-01 RX ORDER — METOPROLOL TARTRATE 50 MG
5 TABLET ORAL EVERY 12 HOURS
Qty: 0 | Refills: 0 | Status: DISCONTINUED | OUTPATIENT
Start: 2017-01-01 | End: 2017-01-01

## 2017-01-01 RX ORDER — ONDANSETRON 8 MG/1
4 TABLET, FILM COATED ORAL ONCE
Qty: 0 | Refills: 0 | Status: COMPLETED | OUTPATIENT
Start: 2017-01-01 | End: 2017-01-01

## 2017-01-01 RX ORDER — SODIUM CHLORIDE 9 MG/ML
1000 INJECTION, SOLUTION INTRAVENOUS
Qty: 0 | Refills: 0 | Status: COMPLETED | OUTPATIENT
Start: 2017-01-01 | End: 2017-01-01

## 2017-01-01 RX ADMIN — PANTOPRAZOLE SODIUM 10 MG/HR: 20 TABLET, DELAYED RELEASE ORAL at 01:45

## 2017-01-01 RX ADMIN — Medication 1.25 MILLIGRAM(S): at 17:32

## 2017-01-01 RX ADMIN — HEPARIN SODIUM 5000 UNIT(S): 5000 INJECTION INTRAVENOUS; SUBCUTANEOUS at 06:04

## 2017-01-01 RX ADMIN — PANTOPRAZOLE SODIUM 40 MILLIGRAM(S): 20 TABLET, DELAYED RELEASE ORAL at 17:23

## 2017-01-01 RX ADMIN — DONEPEZIL HYDROCHLORIDE 5 MILLIGRAM(S): 10 TABLET, FILM COATED ORAL at 23:26

## 2017-01-01 RX ADMIN — PANTOPRAZOLE SODIUM 40 MILLIGRAM(S): 20 TABLET, DELAYED RELEASE ORAL at 05:08

## 2017-01-01 RX ADMIN — Medication 250 MILLIGRAM(S): at 23:45

## 2017-01-01 RX ADMIN — Medication 1.25 MILLIGRAM(S): at 17:23

## 2017-01-01 RX ADMIN — SODIUM CHLORIDE 50 MILLILITER(S): 9 INJECTION, SOLUTION INTRAVENOUS at 21:59

## 2017-01-01 RX ADMIN — Medication 1.25 MILLIGRAM(S): at 02:31

## 2017-01-01 RX ADMIN — Medication 2.5 MILLIGRAM(S): at 10:50

## 2017-01-01 RX ADMIN — SODIUM CHLORIDE 1000 MILLILITER(S): 9 INJECTION INTRAMUSCULAR; INTRAVENOUS; SUBCUTANEOUS at 22:27

## 2017-01-01 RX ADMIN — Medication 2.5 MILLIGRAM(S): at 05:50

## 2017-01-01 RX ADMIN — Medication 10 MILLIGRAM(S): at 18:23

## 2017-01-01 RX ADMIN — DONEPEZIL HYDROCHLORIDE 5 MILLIGRAM(S): 10 TABLET, FILM COATED ORAL at 21:40

## 2017-01-01 RX ADMIN — SODIUM CHLORIDE 50 MILLILITER(S): 9 INJECTION, SOLUTION INTRAVENOUS at 10:51

## 2017-01-01 RX ADMIN — Medication 1.25 MILLIGRAM(S): at 12:47

## 2017-01-01 RX ADMIN — PANTOPRAZOLE SODIUM 40 MILLIGRAM(S): 20 TABLET, DELAYED RELEASE ORAL at 06:04

## 2017-01-01 RX ADMIN — Medication 1.25 MILLIGRAM(S): at 17:33

## 2017-01-01 RX ADMIN — Medication 5 MILLIGRAM(S): at 06:04

## 2017-01-01 RX ADMIN — SODIUM CHLORIDE 4000 MILLILITER(S): 9 INJECTION INTRAMUSCULAR; INTRAVENOUS; SUBCUTANEOUS at 00:28

## 2017-01-01 RX ADMIN — Medication 133 MILLILITER(S): at 13:00

## 2017-01-01 RX ADMIN — Medication 1.25 MILLIGRAM(S): at 11:52

## 2017-01-01 RX ADMIN — Medication 1.25 MILLIGRAM(S): at 01:41

## 2017-01-01 RX ADMIN — PIPERACILLIN AND TAZOBACTAM 200 GRAM(S): 4; .5 INJECTION, POWDER, LYOPHILIZED, FOR SOLUTION INTRAVENOUS at 23:45

## 2017-01-01 RX ADMIN — Medication 1.25 MILLIGRAM(S): at 18:23

## 2017-01-01 RX ADMIN — MEMANTINE HYDROCHLORIDE 10 MILLIGRAM(S): 10 TABLET ORAL at 11:54

## 2017-01-01 RX ADMIN — Medication 3 MILLILITER(S): at 21:40

## 2017-01-01 RX ADMIN — SODIUM CHLORIDE 50 MILLILITER(S): 9 INJECTION, SOLUTION INTRAVENOUS at 05:53

## 2017-01-01 RX ADMIN — Medication 1.25 MILLIGRAM(S): at 17:10

## 2017-01-01 RX ADMIN — Medication 5 MILLIGRAM(S): at 05:08

## 2017-01-01 RX ADMIN — MEMANTINE HYDROCHLORIDE 10 MILLIGRAM(S): 10 TABLET ORAL at 11:59

## 2017-01-01 RX ADMIN — ONDANSETRON 4 MILLIGRAM(S): 8 TABLET, FILM COATED ORAL at 00:28

## 2017-01-01 RX ADMIN — SODIUM CHLORIDE 50 MILLILITER(S): 9 INJECTION, SOLUTION INTRAVENOUS at 12:47

## 2017-01-01 RX ADMIN — PANTOPRAZOLE SODIUM 40 MILLIGRAM(S): 20 TABLET, DELAYED RELEASE ORAL at 17:32

## 2017-01-01 RX ADMIN — PANTOPRAZOLE SODIUM 40 MILLIGRAM(S): 20 TABLET, DELAYED RELEASE ORAL at 17:33

## 2017-01-01 RX ADMIN — Medication 5 MILLIGRAM(S): at 17:32

## 2017-01-01 RX ADMIN — PANTOPRAZOLE SODIUM 10 MG/HR: 20 TABLET, DELAYED RELEASE ORAL at 10:25

## 2017-01-01 RX ADMIN — Medication 2.5 MILLIGRAM(S): at 05:24

## 2017-01-01 RX ADMIN — Medication 2.5 MILLIGRAM(S): at 17:10

## 2017-01-01 RX ADMIN — SODIUM CHLORIDE 50 MILLILITER(S): 9 INJECTION, SOLUTION INTRAVENOUS at 23:26

## 2017-01-01 RX ADMIN — SODIUM CHLORIDE 50 MILLILITER(S): 9 INJECTION, SOLUTION INTRAVENOUS at 11:53

## 2017-01-01 RX ADMIN — SODIUM CHLORIDE 500 MILLILITER(S): 9 INJECTION INTRAMUSCULAR; INTRAVENOUS; SUBCUTANEOUS at 12:16

## 2017-01-01 RX ADMIN — SODIUM CHLORIDE 4000 MILLILITER(S): 9 INJECTION INTRAMUSCULAR; INTRAVENOUS; SUBCUTANEOUS at 03:25

## 2017-01-01 RX ADMIN — PANTOPRAZOLE SODIUM 40 MILLIGRAM(S): 20 TABLET, DELAYED RELEASE ORAL at 17:10

## 2017-01-01 RX ADMIN — Medication 25 MILLIGRAM(S): at 06:05

## 2017-01-01 RX ADMIN — Medication 5 MILLIGRAM(S): at 17:24

## 2017-01-01 RX ADMIN — Medication 650 MILLIGRAM(S): at 22:27

## 2017-01-01 RX ADMIN — Medication 5 MILLIGRAM(S): at 17:33

## 2017-01-01 RX ADMIN — PANTOPRAZOLE SODIUM 40 MILLIGRAM(S): 20 TABLET, DELAYED RELEASE ORAL at 05:53

## 2017-01-01 RX ADMIN — Medication 5 MILLIGRAM(S): at 01:38

## 2017-01-01 RX ADMIN — SODIUM CHLORIDE 50 MILLILITER(S): 9 INJECTION, SOLUTION INTRAVENOUS at 17:11

## 2017-01-01 RX ADMIN — Medication 1.25 MILLIGRAM(S): at 10:03

## 2017-01-01 RX ADMIN — Medication 1.25 MILLIGRAM(S): at 11:30

## 2017-01-01 RX ADMIN — Medication 1.25 MILLIGRAM(S): at 02:59

## 2017-01-01 RX ADMIN — SODIUM CHLORIDE 50 MILLILITER(S): 9 INJECTION, SOLUTION INTRAVENOUS at 01:46

## 2017-01-01 RX ADMIN — Medication 1 ENEMA: at 11:10

## 2017-02-07 NOTE — ED PROVIDER NOTE - MEDICAL DECISION MAKING DETAILS
89 F not on anticoagulants, s/p fall w/ minor frontal head trauma. No focal neuro deficits appreciated however given age CT-head and Ct-neck to eval for ICH or fracture. Hypotensive in triage. Evaluate for source. Dehydration vs occult infection. Give small IV fluid bolus and reassess.

## 2017-02-07 NOTE — ED PROVIDER NOTE - NS ED MD SCRIBE ATTENDING SCRIBE SECTIONS
HIV/PAST MEDICAL/SURGICAL/SOCIAL HISTORY/DISPOSITION/VITAL SIGNS( Pullset)/HISTORY OF PRESENT ILLNESS/PHYSICAL EXAM/REVIEW OF SYSTEMS

## 2017-02-07 NOTE — ED PROVIDER NOTE - DETAILS:
The scribe's documentation has been prepared under my direction and personally reviewed by me in its entirety. I confirm that the note above accurately reflects all work, treatment, procedures, and medical decision making performed by me (Dr. Silva).

## 2017-02-07 NOTE — ED PROVIDER NOTE - OBJECTIVE STATEMENT
89 y/o F w/ PMHx of dementia and HTN BIB NH p/w evaluation for fall onset today. Pt endorses positive head trauma and notes generalized weakness for past few days. Pt denies CP, HA, stomach pain, decreased PO intake, LOC, cough or any other complaint. NKDA. Hx and ROS limited by dementia status. Pt is DNR.

## 2017-02-07 NOTE — ED PROVIDER NOTE - PHYSICAL EXAMINATION
MSK: Full ROM at neck, nontender over posterior midline at neck. Motor 4/5 in all 4 extremities. LE mildly contracted. Distal sensory grossly intact. Nontender over back, no ecchymosis. Pelvis stable.

## 2017-05-03 NOTE — ED PROVIDER NOTE - CPE EDP HEAD NORM PED
Head atraumatic, normal cephalic shape.
I will STOP taking the medications listed below when I get home from the hospital:    Plavix 75 mg oral tablet  -- 1 tab(s) by mouth once a day    Zocor 10 mg oral tablet  -- 1 tab(s) by mouth once a day (at bedtime)    Synthroid 100 mcg (0.1 mg) oral tablet  -- 1 tab(s) by mouth once a day    Flonase 50 mcg/inh nasal spray  -- 1 spray(s) into nose once a day    labetalol 100 mg oral tablet  -- 1 tab(s) by mouth 2 times a day    tamsulosin 0.4 mg oral capsule  -- 1 cap(s) by mouth once a day    Pepcid 20 mg oral tablet  --  by mouth once a day    loratadine 10 mg oral capsule  -- 1 cap(s) by mouth once a day    Urecholine 5 mg oral tablet  -- 1 tab(s) by mouth 2 times a day    lactulose 10 g/15 mL oral syrup  -- 30 milliliter(s) by mouth every other day    Ceftin 250 mg oral tablet  -- 1 tab(s) by mouth 2 times a day

## 2017-07-20 NOTE — H&P ADULT - HISTORY OF PRESENT ILLNESS
Limited history. Patient is a poor historian. No family at bedside.    89yo F with pmhx of HTN, hyperlipidemia, parkison's, and gastritis came from nursing home after 3 episodes of emesis. She c/o LLQ pain which is crampy. Denies fevers, chills, or nausea. Denies chest pain or SOB. Denies dysuria or hematuria. Denies change in BM.    In the ED patient was afebrile, tachy- 103, and hypertensive 200/102. Received 5mg IV metoprolol and 2L NS bolus. No leukocytosis. She had high lactate of 4.8. FOBT negative.

## 2017-07-20 NOTE — ED ADULT NURSE REASSESSMENT NOTE - NS ED NURSE REASSESS COMMENT FT1
Patient remains in no acute distress, medicated as per order for elevated blood pressure. Patient has been repositioned multiple times for comfort. Family member by bedside. Fall precautions in place.

## 2017-07-20 NOTE — CONSULT NOTE ADULT - PROBLEM SELECTOR RECOMMENDATION 9
1- npo  2- recc NGT, pt currently refusing  3- iv hydration  4- enemas  5- out of bed if at all possible  6- AXR in am 7/21  7- d/w Dr Le and agrees

## 2017-07-20 NOTE — H&P ADULT - NSHPREVIEWOFSYSTEMS_GEN_ALL_CORE
Limited ROS:  CONSTITUTIONAL: No fever, weight loss, or fatigue  NECK: No pain or stiffness  RESPIRATORY: No cough, wheezing, chills or hemoptysis; No shortness of breath  CARDIOVASCULAR: No chest pain, palpitations, dizziness, or leg swelling  GASTROINTESTINAL: LLQ pain, nausea, and vomiting. No diarrhea or constipation. No melena or hematochezia.  GENITOURINARY: No dysuria, frequency, hematuria, or incontinence  MUSCULOSKELETAL: No joint pain or swelling; No muscle, back, or extremity pain

## 2017-07-20 NOTE — H&P ADULT - ATTENDING COMMENTS
chart reviewed, patient seen and seen and examined, above noted, agree with management.  will d/w HCP.

## 2017-07-20 NOTE — CONSULT NOTE ADULT - SUBJECTIVE AND OBJECTIVE BOX
HPI:  Limited history. Patient is a poor historian. No family at bedside.    89yo F with pmhx of HTN, hyperlipidemia, parkison's, and gastritis came from nursing home after 3 episodes of emesis. She c/o LLQ pain which is crampy. Denies fevers, chills, or nausea. Denies chest pain or SOB. Denies dysuria or hematuria. Denies change in BM.    Today pt is quiet, and comfortable. Has no complaints of abd pain, nausea or vomitting. Lactate on admission was 4, and is now 1.5 with adequate hydration. 1 documented stool yesterday, and she states she is occasionally having bowel movements    Vital Signs Last 24 Hrs  T(C): 36.8 (20 Jul 2017 13:17), Max: 37.3 (20 Jul 2017 03:14)  T(F): 98.3 (20 Jul 2017 13:17), Max: 99.1 (20 Jul 2017 03:14)  HR: 89 (20 Jul 2017 13:17) (84 - 103)  BP: 173/79 (20 Jul 2017 13:17) (173/79 - 200/102)  BP(mean): --  RR: 17 (20 Jul 2017 13:17) (16 - 18)  SpO2: 95% (20 Jul 2017 13:17) (94% - 99%)    Physical:  General: legally blind, follows commands, and responds appropriately  Abd: Distended. Tympanic. no peritoneal signs noted. nontender, no gaurding/rebound/rigidity                          15.2   14.6  )-----------( 166      ( 20 Jul 2017 09:56 )             46.6   07-20    142  |  110<H>  |  34<H>  ----------------------------<  171<H>  4.9   |  22  |  1.25    Ca    9.2      20 Jul 2017 09:56    TPro  7.2  /  Alb  2.6<L>  /  TBili  0.3  /  DBili  x   /  AST  19  /  ALT  21  /  AlkPhos  105  07-20    lactate 1.5    CT: Abd/Pelvis:  Large hiatal hernia.    Ascites in the abdomen with extension through the hiatus.    Diffuse small bowel dilatation without a focal transition point   identified. Finding may represent small bowel obstruction or ileus.    Large amount of stool in the rectum, compatible with fecal impaction.

## 2017-07-20 NOTE — H&P ADULT - PROBLEM SELECTOR PLAN 1
Patient likely has gastritis vs SBO vs diverticulitis. Received 2L bolus in ED  - F/u CT a/p  - Zofran prn nausea or vomiting  - repeat lactate  - NS 100ml/hr Patient likely has gastritis vs SBO vs diverticulitis. Received 2L bolus in ED  - F/u CT a/p  - Zofran prn nausea or vomiting  - repeat lactate  -  NPO  - Dextrose 5%@ 100cc/hr

## 2017-07-20 NOTE — H&P ADULT - NSHPPHYSICALEXAM_GEN_ALL_CORE
Vital Signs Last 24 Hrs  T(C): 37.3 (20 Jul 2017 03:14), Max: 37.3 (20 Jul 2017 03:14)  T(F): 99.1 (20 Jul 2017 03:14), Max: 99.1 (20 Jul 2017 03:14)  HR: 94 (20 Jul 2017 03:14) (92 - 103)  BP: 187/85 (20 Jul 2017 03:14) (187/85 - 200/102)  RR: 16 (20 Jul 2017 03:14) (16 - 18)  SpO2: 97% (20 Jul 2017 03:14) (95% - 99%)    GENERAL: NAD, lyng comfortably in bed  NECK: Supple  NERVOUS SYSTEM:  Alert   CHEST/LUNG: Clear to auscultation bilaterally; No rales, rhonchi, wheezing, or rubs  HEART: Regular rate and rhythm; No murmurs, rubs, or gallops  ABDOMEN: Soft, Nontender, Nondistended, no gaurding; Bowel sounds present  EXTREMITIES:  2+ Peripheral Pulses, No clubbing, cyanosis, or edema  LYMPH: No lymphadenopathy noted  SKIN: No rashes or lesions

## 2017-07-20 NOTE — CHART NOTE - NSCHARTNOTEFT_GEN_A_CORE
PGY-2 Varsha Valverde and I spoke with pt's daughter and family members at bedside at length today. Goals of care (DNR/DNI) were discussed including all potential risks vs. benefits. Pt's daughter (healthcare proxy) decided NO DNR/DNI at this time. I informed Dr. Oropeza this update as pt previously was DNR. Pt is full code. PGY-2 Varsha Valverde and I spoke with pt's daughter and family members at bedside at length today. Goals of care (DNR/DNI) were discussed including all potential risks vs. benefits. Pt's daughter (healthcare proxy) decided NO DNR/DNI at this time. I informed Dr. Oropeza this update as pt previously was DNR. Pt is full code.    PGY-2 Dr. Valverde and Dr. Wong and PGY-1 tried to advance NGT 5 times today; however, pt is constricting pharyngeal muscles and NGT can't be advance. Dr. Oropeza is notified.

## 2017-07-20 NOTE — ED PROVIDER NOTE - ENMT, MLM
Airway patent, Nasal mucosa clear. Mouth with normal mucosa. Throat has no vesicles, no oropharyngeal exudates and uvula is midline. Blind

## 2017-07-20 NOTE — H&P ADULT - ASSESSMENT
89yo F with h/o HTN, HLD came in from NH after 3 episodes of vomiting and c/o LLQ pain. She is afebrile, no leukocytosis and no guarding or tenderness on exam. Unlikely an infectious etiology. Will defer antiobiotics for now pending CT a/p.

## 2017-07-20 NOTE — ED PROVIDER NOTE - PMH
Anemia    Dementia    Gastro-esophageal reflux disease without esophagitis    History of hypertension

## 2017-07-20 NOTE — H&P ADULT - PROBLEM SELECTOR PLAN 2
Dehydration likely secondary to vomiting  - NS@100   - CTM Dehydration likely secondary to vomiting  - dextrose 5%@ 100 cc/hr   - CTM

## 2017-07-20 NOTE — H&P ADULT - NSHPLABSRESULTS_GEN_ALL_CORE
16.3   9.9   )-----------( 161      ( 20 Jul 2017 00:24 )             52.3     07-20    139  |  102  |  28<H>  ----------------------------<  239<H>  4.9   |  24  |  1.55<H>    Ca    10.3      20 Jul 2017 00:24    TPro  8.5<H>  /  Alb  3.2<L>  /  TBili  0.3  /  DBili  x   /  AST  20  /  ALT  21  /  AlkPhos  127<H>  07-20    Lactate, Blood (07.20.17 @ 00:24)    Lactate, Blood: 4.8    Lactate, Blood (07.20.17 @ 02:26)    Lactate, Blood: 3.9 mmol/L

## 2017-07-20 NOTE — ED PROVIDER NOTE - CARE PLAN
Principal Discharge DX:	Dehydration  Secondary Diagnosis:	Intractable vomiting with nausea, unspecified vomiting type

## 2017-07-20 NOTE — CHART NOTE - NSCHARTNOTEFT_GEN_A_CORE
I tried calling pt's daughter (healthcare proxy) Aretha Olvera [phone] (cell) - no one picked up and voice mail is full. Will try again later to clarify status for DNR/DNI.

## 2017-07-21 NOTE — CHART NOTE - NSCHARTNOTEFT_GEN_A_CORE
Upon Nutritional Assessment by the Registered Dietitian your patient was determined to meet criteria / has evidence of the following diagnosis/diagnoses:          [ ]  Mild Protein Calorie Malnutrition        [ ]  Moderate Protein Calorie Malnutrition        [ X ] Severe Protein Calorie Malnutrition        [ ] Unspecified Protein Calorie Malnutrition        [ X ] Underweight / BMI <19        [ ] Morbid Obesity / BMI > 40      Findings as based on:  •  Comprehensive nutrition assessment and consultation  •  Calorie counts (nutrient intake analysis)  •  Food acceptance and intake status from observations by staff  •  Follow up  •  Patient education  •  Intervention secondary to interdisciplinary rounds  •   concerns      Treatment:    The following diet has been recommended: Advance diet or consider alternate nutrition support if NPO prolonged further       PROVIDER Section:     By signing this assessment you are acknowledging and agree with the diagnosis/diagnoses assigned by the Registered Dietitian    Comments:

## 2017-07-21 NOTE — DIETITIAN INITIAL EVALUATION ADULT. - ADHERENCE
diet Rx at nursing home: 2g Na, puree, no bread/sandwich, Ensure Pudding 1can bid, 2cal HN 1can tid/n/a

## 2017-07-21 NOTE — CONSULT NOTE ADULT - PROBLEM SELECTOR RECOMMENDATION 3
- As per CT scan diffuse small bowel dilatation without a focal transition point may represent small bowel obstruction or ileus, findings likely due to fecal impaction.   - C/w NGT , IVF, monitor the electrolytes - As per CT scan diffuse small bowel dilatation without a focal transition point may represent small bowel obstruction or ileus, findings likely due to fecal impaction.   - Surgical evaluation recommended for NGT , tried by primary team yesterday but patient is refusing .   - C/w IVF, monitor the electrolytes, recommend for NGT.

## 2017-07-21 NOTE — PROGRESS NOTE ADULT - SUBJECTIVE AND OBJECTIVE BOX
PGY 1 Note discussed with supervising resident and primary attending    Patient is a 89y old  Female who presents with a chief complaint of Vomiting (2017 04:27)      INTERVAL HPI/OVERNIGHT EVENTS: Pt was seen and examined at bedside. I spoke with pt's daughter (healthcare proxy) today and updated her with pt's progress. Pt has no new complaints.    MEDICATIONS  (STANDING):  sodium chloride 0.9% Bolus 500 milliLiter(s) IV Bolus once  donepezil 5 milliGRAM(s) Oral at bedtime  memantine 10 milliGRAM(s) Oral daily  metoprolol Injectable 2.5 milliGRAM(s) IV Push every 12 hours  enalaprilat Injectable 1.25 milliGRAM(s) IV Push every 8 hours  pantoprazole  Injectable 40 milliGRAM(s) IV Push every 12 hours  dextrose 5% + sodium chloride 0.9%. 1000 milliLiter(s) (50 mL/Hr) IV Continuous <Continuous>    MEDICATIONS  (PRN):      __________________________________________________  REVIEW OF SYSTEMS:    EYES: legally blind  RESPIRATORY: No cough; No shortness of breath  CARDIOVASCULAR: No chest pain, no palpitations  GASTROINTESTINAL: No pain. No nausea or vomiting; 2 BM overnight  ALL OTHER ROS negative        Vital Signs Last 24 Hrs  T(C): 37.2 (2017 14:30), Max: 37.2 (2017 14:30)  T(F): 99 (2017 14:30), Max: 99 (2017 14:30)  HR: 96 (2017 14:30) (92 - 104)  BP: 177/79 (2017 14:30) (143/76 - 177/79)  BP(mean): --  RR: 17 (2017 14:30) (17 - 18)  SpO2: 94% (2017 14:30) (93% - 96%)    ________________________________________________  PHYSICAL EXAM:  GENERAL: NAD. Thin. Bed-bound  HEENT: Normocephalic;  conjunctivae and sclerae clear; moist mucous membranes;   NECK : supple  CHEST/LUNG: Clear to auscultation bilaterally with good air entry   HEART: S1 S2  regular; no murmurs, gallops or rubs  ABDOMEN: Mildly distended, soft; Bowel sounds present  EXTREMITIES: no cyanosis; no edema; no calf tenderness; peripheral pulses intact  SKIN: warm and dry; no rash  NERVOUS SYSTEM:  Awake and alert    _________________________________________________  LABS:                        14.6   14.4  )-----------( 150      ( 2017 10:48 )             44.6     -    145  |  115<H>  |  27<H>  ----------------------------<  123<H>  4.4   |  23  |  0.86    Ca    9.0      2017 10:48  Phos  2.5     -  Mg     2.4         TPro  7.2  /  Alb  2.6<L>  /  TBili  0.3  /  DBili  x   /  AST  19  /  ALT  21  /  AlkPhos  105  -    PT/INR - ( 2017 00:24 )   PT: 10.7 sec;   INR: 0.98 ratio         PTT - ( 2017 00:24 )  PTT:36.6 sec  Urinalysis Basic - ( 2017 02:47 )    Color: Yellow / Appearance: Clear / S.025 / pH: x  Gluc: x / Ketone: Negative  / Bili: Negative / Urobili: 1   Blood: x / Protein: 30 mg/dL / Nitrite: Positive   Leuk Esterase: Trace / RBC: 2-5 /HPF / WBC 3-5 /HPF   Sq Epi: x / Non Sq Epi: Few / Bacteria: Moderate /HPF        RADIOLOGY & ADDITIONAL TESTS:  Xray Abdomen - IMPRESSION:   Persistent small bowel ileus versus partial small bowel obstruction - not   significantly changed compared with 17 CT scan. Recommend short   interval (3-6hrs) plain film followup to reassess. At that time cross   sectional imaging can be done.    Imaging Personally Reviewed:  YES    Consultant(s) Notes Reviewed:   YES    Care Discussed with PGY-2/Attending/Consultants :  YES    Plan of care was discussed with patient and /or primary care giver; all questions and concerns were addressed and care was aligned with patient's wishes.

## 2017-07-21 NOTE — PROGRESS NOTE ADULT - PROBLEM SELECTOR PLAN 2
Dehydration likely secondary to vomiting  - dextrose 5%@ 100 cc/hr   - CTM Resolved.. Cr today 0.86... 1.55 on admission

## 2017-07-21 NOTE — CONSULT NOTE ADULT - SUBJECTIVE AND OBJECTIVE BOX
Patient is a 89y old  Female who presents with a chief complaint of Vomiting (2017 04:27)      INTERVAL HPI/OVERNIGHT EVENTS: Unable to get any history       REVIEW OF SYSTEMS:  Unable to obtain as patient demented       PHYSICAL EXAM:  GENERAL: lethargic   HEAD:  , Normocephalic  EYES:  conjunctiva and sclera clear  NECK: Supple, No JVD    NERVOUS SYSTEM:  Alert & Oriented 1   CHEST/LUNG: Clear to auscultation bilaterally;   HEART: S1 S2+;   ABDOMEN: Soft, Nontender, distended; Bowel sounds present      _________________________________________________  LABS:                        15.3   19.5  )-----------( 156      ( 2017 18:06 )             48.9         142  |  110<H>  |  34<H>  ----------------------------<  171<H>  4.9   |  22  |  1.25    Ca    9.2      2017 09:56    TPro  7.2  /  Alb  2.6<L>  /  TBili  0.3  /  DBili  x   /  AST  19  /  ALT  21  /  AlkPhos  105  -    PT/INR - ( 2017 00:24 )   PT: 10.7 sec;   INR: 0.98 ratio         PTT - ( 2017 00:24 )  PTT:36.6 sec  Urinalysis Basic - ( 2017 02:47 )    Color: Yellow / Appearance: Clear / S.025 / pH: x  Gluc: x / Ketone: Negative  / Bili: Negative / Urobili: 1   Blood: x / Protein: 30 mg/dL / Nitrite: Positive   Leuk Esterase: Trace / RBC: 2-5 /HPF / WBC 3-5 /HPF   Sq Epi: x / Non Sq Epi: Few / Bacteria: Moderate /HPF     CT Abdomen and Pelvis No Cont (17 @ 09:02) >  Grossly stable small bilateral pleural effusions. Large hiatal hernia. Nonspecific small left lower lobe lung nodule; follow-up chest CT may be  pursued in 6 months to ensure stability. Ascites in the abdomen with extension through the hiatus. Diffuse small bowel dilatation without a focal transition point  identified. Finding may represent small bowel obstruction or ileus. Large amount of stool in the rectum, compatible with fecal impaction.    Xray Chest 1 View AP- PORTABLE-Urgent (17 @ 17:17) >   Heart size is enlarged. There is a hiatal hernia. There is an enteric tube with the tip in the upper esophagus. Further advancement is recommended. There is no pneumothorax. Lungs are grossly clear.

## 2017-07-21 NOTE — PROGRESS NOTE ADULT - SUBJECTIVE AND OBJECTIVE BOX
INTERVAL HPI/OVERNIGHT EVENTS:  Pt stable.   flatus/BM.  NPO    Vital Signs Last 24 Hrs  T(C): 37.2 (21 Jul 2017 14:30), Max: 37.2 (21 Jul 2017 14:30)  T(F): 99 (21 Jul 2017 14:30), Max: 99 (21 Jul 2017 14:30)  HR: 96 (21 Jul 2017 14:30) (92 - 104)  BP: 177/79 (21 Jul 2017 14:30) (143/76 - 177/79)  BP(mean): --  RR: 17 (21 Jul 2017 14:30) (17 - 18)  SpO2: 94% (21 Jul 2017 14:30) (93% - 96%)    Physical:  Abdomen: Soft, mildly distended, nontender.  No palpable hernias    I&O's Summary      LABS:                        14.6   14.4  )-----------( 150      ( 21 Jul 2017 10:48 )             44.6             07-21    145  |  115<H>  |  27<H>  ----------------------------<  123<H>  4.4   |  23  |  0.86    Ca    9.0      21 Jul 2017 10:48  Phos  2.5     07-21  Mg     2.4     07-21    TPro  7.2  /  Alb  2.6<L>  /  TBili  0.3  /  DBili  x   /  AST  19  /  ALT  21  /  AlkPhos  105  07-20

## 2017-07-21 NOTE — PROGRESS NOTE ADULT - ASSESSMENT
89 F w/ PMH HTN, HLD came in from NH after 3 episodes of vomiting (coffee ground emesis) and c/o LLQ pain. Pt was admitted for SBO. CT scan was significant for Persistent small bowel ileus versus partial small bowel obstruction. 89 F w/ PMH HTN, HLD came in from NH after 3 episodes of vomiting (coffee ground emesis) and c/o LLQ pain. Pt was admitted for SBO. CT scan was significant for persistent small bowel ileus versus partial small bowel obstruction.

## 2017-07-21 NOTE — DIETITIAN INITIAL EVALUATION ADULT. - NUTRITION INTERVENTION
Meals and Snack/Collaboration and Referral of Nutrition Care/Discharge and Transfer of Nutrition Care to New Setting

## 2017-07-21 NOTE — PROGRESS NOTE ADULT - PROBLEM SELECTOR PLAN 1
Patient likely has gastritis vs SBO vs diverticulitis. Received 2L bolus in ED  - F/u CT a/p  - Zofran prn nausea or vomiting  - repeat lactate  -  NPO  - Dextrose 5%@ 100cc/hr Surgery Service Dr. Le following pt. Appreciate advice.   NGT was attempted both by medical and surgical team but unsuccessful.  Rec daily Xray-abd/pelvis, enemas for disimpaction, NPO, f/u GI  Xray abd/pelvis from AM shows persistent SBO, not improved since CT abd from yesterday... follow up with repeat xray in PM.    GI Service Dr. Kulkarni following pt. Appreciate advice.   likely stress induced gastropathy... keep NPO  IV protonix push  Monitor CBC... no procedure at this time

## 2017-07-21 NOTE — PROGRESS NOTE ADULT - SUBJECTIVE AND OBJECTIVE BOX
POD#: PSBO, pt nonverbal, not alert, as per nurse pt with no nausea and vomiting,     Subjective:89yFemale  Pt seen and examined at bedside.     Vital Signs Last 24 Hrs  T(C): 36.1 (21 Jul 2017 04:52), Max: 36.8 (20 Jul 2017 13:17)  T(F): 97 (21 Jul 2017 04:52), Max: 98.3 (20 Jul 2017 13:17)  HR: 104 (21 Jul 2017 04:52) (89 - 104)  BP: 143/76 (21 Jul 2017 04:52) (143/76 - 173/79)  BP(mean): --  RR: 18 (21 Jul 2017 04:52) (17 - 18)  SpO2: 96% (21 Jul 2017 04:52) (93% - 96%)  I&O's Detail      Physical Exam  General:No acute distress  Adomen: surgical incision noted midline, abd distended, tympanic, nt, ng          Labs:                        14.6   14.4  )-----------( 150      ( 21 Jul 2017 10:48 )             44.6     07-21    145  |  115<H>  |  27<H>  ----------------------------<  123<H>  4.4   |  23  |  0.86    Ca    9.0      21 Jul 2017 10:48  Phos  2.5     07-21  Mg     2.4     07-21    TPro  7.2  /  Alb  2.6<L>  /  TBili  0.3  /  DBili  x   /  AST  19  /  ALT  21  /  AlkPhos  105  07-20    PT/INR - ( 20 Jul 2017 00:24 )   PT: 10.7 sec;   INR: 0.98 ratio         PTT - ( 20 Jul 2017 00:24 )  PTT:36.6 sec        A/P: 89yFemale s/p SBO/illeus, fecal impaction

## 2017-07-21 NOTE — CONSULT NOTE ADULT - ATTENDING COMMENTS
I was physically present for the key portions of the evaluation and management (E/M) service provided.  The patient was personally seen and examined at bedside.  I reviewed the resident's  H& P , ROS,  Exam, assessment and plan. VS and labs  were personally reviewed.   I agree with  the all of the above with the following additions:    Summary:  A 89 year old bedbound demented female presents with coffee ground emesis and abdominal pain. Exam show a nonverbal pateint with decreased bowel sounds and distended tympanic abdomen.  Labs show  a stable hemoglobin and hemoconcentration. CT  without oral contrast is consistent with a SBO and fecal impaction.     Plan:  SBO- NGT  if possible   Seriel abdominal exam   Surgical follow up    Coffee ground emesis   Likely stress induced gastropathy   NPO for now   IV PPI Pushes   Monitor CBC   Not a candidate for intervention (i.e Endoscopy) at this time.     Fecal impaction   One Mineral oil enema followed by tap water enema Daily until fecal impaction improved     Thank you for your consultation and allowing  me to participate in the care of your patients. If you have further questions please contact me at [phone].

## 2017-07-21 NOTE — CONSULT NOTE ADULT - PROBLEM SELECTOR RECOMMENDATION 2
- CT scan shows large stool in rectum, patient has 2 BM yesterday after enema   - Continue with enema PRN  - F/up on AXR - CT scan shows large stool in rectum, patient has 2 BM yesterday after enema   - Continue with enema PRN  -  AXR shows multiple dilated loops

## 2017-07-21 NOTE — PROGRESS NOTE ADULT - PROBLEM SELECTOR PLAN 1
1-attempted to place NGT, pt noncompliant, unable to place, fecal disimpaction with stool  2-npo  3-recommend GI  4-enema  5-repeat xray daily  discussed with attending

## 2017-07-21 NOTE — PROGRESS NOTE ADULT - PROBLEM SELECTOR PLAN 3
Continue metoprolol 25 bid c/w IV push vasotec 1.25mg q8h  c/w IV push lopressor 2.5mg q12h  Monitor BP

## 2017-07-21 NOTE — DIETITIAN INITIAL EVALUATION ADULT. - OTHER INFO
Nutrition assessment for low BMI; from skilled nursing facility; NPO x 2d, SBO vs ileus, fecal impaction, NGT insertion unsuccessful Nutrition assessment for low BMI; from skilled nursing facility; NPO x 2d, SBO vs ileus, fecal impaction, NGT insertion unsuccessful.

## 2017-07-21 NOTE — CONSULT NOTE ADULT - ASSESSMENT
89 Y/O F, from NH, pmhx of HTN, HLD, demntia, gastritis sent from NH after 3 episodes of vomiting and complaining of left lower quadrant pain. Patient is unable to give any history. CT scan done found to have fecal impaction , ileus Vs SBO, large hiatal hernia . Also has severe dehydration . Given enema yesterday , had 2 BM uptil now. For concern of hematemesis patient was started on Protonix drip. 89 Y/O F, from NH, pmhx of HTN, HLD, demntia, gastritis sent from NH after 3 episodes of vomiting and complaining of left lower quadrant pain. Patient is unable to give any history. CT scan done found to have fecal impaction , ileus Vs SBO, large hiatal hernia, ascitics with extension to hiatus  . Also has severe dehydration . Given enema yesterday , had 2 BM uptil now. For concern of hematemesis patient was started on Protonix drip.

## 2017-07-21 NOTE — PROGRESS NOTE ADULT - SUBJECTIVE AND OBJECTIVE BOX
Patient is a 89y old  Female who presents with a chief complaint of Vomiting (2017 04:27)  unable to pass NG tube,    INTERVAL HPI/OVERNIGHT EVENTS:  noted, sbo  Vital Signs Last 24 Hrs  T(C): 36.1 (2017 04:52), Max: 37.1 (2017 11:24)  T(F): 97 (2017 04:52), Max: 98.8 (2017 11:24)  HR: 104 (2017 04:52) (84 - 104)  BP: 143/76 (2017 04:52) (143/76 - 183/80)  BP(mean): --  RR: 18 (2017 04:52) (16 - 18)  SpO2: 96% (2017 04:52) (93% - 96%)      LABS:                        15.3   19.5  )-----------( 156      ( 2017 18:06 )             48.9     07-20    1                      15.3   19.5  )-----------( 156      ( 2017 18:06 )             48.9   07-20    142  |  110<H>  |  34<H>  ----------------------------<  171<H>  4.9   |  22  |  1.25    Ca    9.2      2017 09:56    TPro  7.2  /  Alb  2.6<L>  /  TBili  0.3  /  DBili  x   /  AST  19  /  ALT  21  /  AlkPhos  105  07-20              Urinalysis Basic - ( 2017 02:47 )    Color: Yellow / Appearance: Clear / S.025 / pH: x  Gluc: x / Ketone: Negative  / Bili: Negative / Urobili: 1   Blood: x / Protein: 30 mg/dL / Nitrite: Positive   Leuk Esterase: Trace / RBC: 2-5 /HPF / WBC 3-5 /HPF   Sq Epi: x / Non Sq Epi: Few / Bacteria: Moderate /HPF      REVIEW OF SYSTEMS:  unable to obtain, confused      Imaging Personally Reviewed:  [ ] YES  [x ] NO    Consultant(s) Notes Reviewed:  [x ] YES  [ ] NO   Surgical.    PHYSICAL EXAM:  GENERAL: NAD, well-groomed, well-developed  HEAD:  Atraumatic, Normocephalic  EYES:legally blind,   ENMT: No tonsillar erythema, exudates, or enlargement; Moist mucous membranes, Good dentition, No lesions  NECK: Supple, No JVD, Normal thyroid  NERVOUS SYSTEM:  Alert & Oriented X3, Good concentration; Motor Strength 5/5 B/L upper and lower extremities; DTRs 2+ intact and symmetric  CHEST/LUNG: Clear to percussion bilaterally; No rales, rhonchi, wheezing, or rubs  HEART: Regular rate and rhythm; No murmurs, rubs, or gallops  ABDOMEN: Soft, Nontender, Nondistended; Bowel sounds present  EXTREMITIES:  2+ Peripheral Pulses, No clubbing, cyanosis, or edema  LYMPH: No lymphadenopathy noted  SKIN: No rashes or lesions    Care Discussed with Consultants/Other Providers [ ] YES  [ ] NO    sodium chloride 0.9% Bolus 500 milliLiter(s) IV Bolus once  donepezil 5 milliGRAM(s) Oral at bedtime  memantine 10 milliGRAM(s) Oral daily  dextrose 5% + sodium chloride 0.9%. 1000 milliLiter(s) IV Continuous <Continuous>  pantoprazole Infusion 8 mG/Hr IV Continuous <Continuous>  metoprolol Injectable 2.5 milliGRAM(s) IV Push every 12 hours  enalaprilat Injectable 1.25 milliGRAM(s) IV Push every 8 hours      A/P  GIB, OA, dementia, leucocytosis, SBO.HTN, CAD    Monitor h/h  GI consult, enema prn  d/w House staff.  Discharge planning.  Advance directives d/w family,  not a DNR or DNI.

## 2017-07-21 NOTE — CONSULT NOTE ADULT - PROBLEM SELECTOR RECOMMENDATION 9
-Likely due to stress related ( SBO , ileus)  - Lactate initially high, normalized with IVF   - No hematemesis, melena , fecal occult negative , CBC stable   - No need for Protonix drip, changed to 40 IV q12  - Monitor CBC  - Avoid NSAIDS

## 2017-07-22 NOTE — PROGRESS NOTE ADULT - SUBJECTIVE AND OBJECTIVE BOX
CHIEF COMPLAINT:Patient is a 89y old  Female who presents with a chief complaint of Vomiting (20 Jul 2017 04:27)  covering for dr hubbard, pt came symptoms of intestinal obstr, constipation. seen by surgery.    today had 1 bm  yesterday 4-5  	          REVIEW OF SYSTEMS:  CONSTITUTIONAL: No fever, weight loss, or fatigue  EYES: No eye pain, visual disturbances, or discharge  NECK: No pain or stiffness  RESPIRATORY: No cough, wheezing, chills or hemoptysis; No Shortness of Breath  CARDIOVASCULAR: No chest pain, palpitations, passing out, dizziness, or leg swelling  GASTROINTESTINAL: No abdominal or epigastric pain. No nausea, vomiting, or hematemesis; No diarrhea or constipation. No melena or hematochezia.  GENITOURINARY: No dysuria, frequency, hematuria, or incontinence  NEUROLOGICAL: No headaches, memory loss, loss of strength, numbness, or tremors  SKIN: No itching, burning, rashes, or lesions   LYMPH Nodes: No enlarged glands  ENDOCRINE: No heat or cold intolerance; No hair loss  MUSCULOSKELETAL: No joint pain or swelling; No muscle, back, or extremity pain    Medications:  MEDICATIONS  (STANDING):  sodium chloride 0.9% Bolus 500 milliLiter(s) IV Bolus once  donepezil 5 milliGRAM(s) Oral at bedtime  memantine 10 milliGRAM(s) Oral daily  enalaprilat Injectable 1.25 milliGRAM(s) IV Push every 8 hours  pantoprazole  Injectable 40 milliGRAM(s) IV Push every 12 hours  dextrose 5% + sodium chloride 0.9%. 1000 milliLiter(s) (50 mL/Hr) IV Continuous <Continuous>  dextrose 5% + sodium chloride 0.45%. 1000 milliLiter(s) (50 mL/Hr) IV Continuous <Continuous>  metoprolol Injectable 5 milliGRAM(s) IV Push every 12 hours    MEDICATIONS  (PRN):    	    PHYSICAL EXAM:  T(C): 37.1 (07-22-17 @ 14:34), Max: 37.1 (07-21-17 @ 22:09)  HR: 97 (07-22-17 @ 17:31) (88 - 99)  BP: 148/78 (07-22-17 @ 17:31) (148/78 - 173/84)  RR: 16 (07-22-17 @ 14:34) (16 - 16)  SpO2: 93% (07-22-17 @ 14:34) (93% - 93%)  Wt(kg): --  I&O's Summary      Appearance: Normal	  HEENT:   Normal oral mucosa, PERRL, EOMI	  Lymphatic: No lymphadenopathy  Cardiovascular: Normal S1 S2, No JVD, No murmurs, No edema  Respiratory: Lungs clear to auscultation	  Psychiatry: A & O x 3, Mood & affect appropriate  Gastrointestinal:  Soft, Non-tender, + BS	  Skin: No rashes, No ecchymoses, No cyanosis	  Neurologic: Non-focal  Extremities: Normal range of motion, No clubbing, cyanosis or edema  Vascular: Peripheral pulses palpable 2+ bilaterally    TELEMETRY: 	    ECG:  	  RADIOLOGY:  OTHER: 	  	  LABS:	 	    CARDIAC MARKERS:                                13.4   8.5   )-----------( 125      ( 22 Jul 2017 06:41 )             41.6     07-21    145  |  115<H>  |  27<H>  ----------------------------<  123<H>  4.4   |  23  |  0.86    Ca    9.0      21 Jul 2017 10:48  Phos  2.5     07-21  Mg     2.4     07-21      proBNP:   Lipid Profile:   HgA1c:   TSH: CHIEF COMPLAINT:Patient is a 89y old  Female who presents with a chief complaint of Vomiting (20 Jul 2017 04:27)  covering for dr hubbard, pt came symptoms of intestinal obstr, constipation. seen by surgery.    today had 1 bm  yesterday 4-5  	          REVIEW OF SYSTEMS:  CONSTITUTIONAL: No fever, weight loss, or fatigue  EYES: No eye pain, visual disturbances, or discharge  NECK: No pain or stiffness  RESPIRATORY: No cough, wheezing, chills or hemoptysis; No Shortness of Breath  CARDIOVASCULAR: No chest pain, palpitations, passing out, dizziness, or leg swelling  GASTROINTESTINA bs present  surgical scar 9( had csection 55 yrs ago)  non tender   GENITOURINARY: No dysuria, frequency, hematuria, or incontinence  NEUROLOGICAL: No headaches, memory loss, loss of strength, numbness, or tremors  SKIN: No itching, burning, rashes, or lesions   LYMPH Nodes: No enlarged glands  ENDOCRINE: No heat or cold intolerance; No hair loss  MUSCULOSKELETAL: No joint pain or swelling; No muscle, back, or extremity pain    Medications:  MEDICATIONS  (STANDING):  sodium chloride 0.9% Bolus 500 milliLiter(s) IV Bolus once  donepezil 5 milliGRAM(s) Oral at bedtime  memantine 10 milliGRAM(s) Oral daily  enalaprilat Injectable 1.25 milliGRAM(s) IV Push every 8 hours  pantoprazole  Injectable 40 milliGRAM(s) IV Push every 12 hours  dextrose 5% + sodium chloride 0.9%. 1000 milliLiter(s) (50 mL/Hr) IV Continuous <Continuous>  dextrose 5% + sodium chloride 0.45%. 1000 milliLiter(s) (50 mL/Hr) IV Continuous <Continuous>  metoprolol Injectable 5 milliGRAM(s) IV Push every 12 hours    MEDICATIONS  (PRN):    	    PHYSICAL EXAM:  T(C): 37.1 (07-22-17 @ 14:34), Max: 37.1 (07-21-17 @ 22:09)  HR: 97 (07-22-17 @ 17:31) (88 - 99)  BP: 148/78 (07-22-17 @ 17:31) (148/78 - 173/84)  RR: 16 (07-22-17 @ 14:34) (16 - 16)  SpO2: 93% (07-22-17 @ 14:34) (93% - 93%)  Wt(kg): --  I&O's Summary      Appearance: Normal	  HEENT:   Normal oral mucosa, PERRL, EOMI	  Lymphatic: No lymphadenopathy  Cardiovascular: Normal S1 S2, No JVD, No murmurs, No edema  Respiratory: Lungs clear to auscultation	  Psychiatry: A & O x 3, Mood & affect appropriate  Gastrointestinal:  Soft, Non-tender, + BS	  Skin: No rashes, No ecchymoses, No cyanosis	  Neurologic: Non-focal  Extremities: Normal range of motion, No clubbing, cyanosis or edema  Vascular: Peripheral pulses palpable 2+ bilaterally    TELEMETRY: 	    ECG:  	  RADIOLOGY:  OTHER: 	  	  LABS:	 	    CARDIAC MARKERS:                                13.4   8.5   )-----------( 125      ( 22 Jul 2017 06:41 )             41.6     07-21    145  |  115<H>  |  27<H>  ----------------------------<  123<H>  4.4   |  23  |  0.86    Ca    9.0      21 Jul 2017 10:48  Phos  2.5     07-21  Mg     2.4     07-21      proBNP:   Lipid Profile:   HgA1c:   TSH:

## 2017-07-22 NOTE — PROGRESS NOTE ADULT - SUBJECTIVE AND OBJECTIVE BOX
INTERVAL HPI/OVERNIGHT EVENTS:    Pt seen and examined at bedside. Nonverbal, no acute events overnight. Multiple BMs after enema as per nursing staff.     Vital Signs Last 24 Hrs  T(C): 36.8 (22 Jul 2017 05:07), Max: 37.2 (21 Jul 2017 14:30)  T(F): 98.3 (22 Jul 2017 05:07), Max: 99 (21 Jul 2017 14:30)  HR: 90 (22 Jul 2017 08:40) (90 - 99)  BP: 172/90 (22 Jul 2017 08:40) (156/73 - 177/79)  BP(mean): --  RR: 16 (22 Jul 2017 05:07) (16 - 17)  SpO2: 93% (22 Jul 2017 05:07) (93% - 94%)  I&O's Detail    pantoprazole  Injectable 40 milliGRAM(s) IV Push every 12 hours      Physical Exam  Abdomen: soft, nondistended, nontender, no palpable masses  Extremities: non edematous, no calf pain bilaterally        Labs:                        13.4   8.5   )-----------( 125      ( 22 Jul 2017 06:41 )             41.6     07-21    145  |  115<H>  |  27<H>  ----------------------------<  123<H>  4.4   |  23  |  0.86    Ca    9.0      21 Jul 2017 10:48  Phos  2.5     07-21  Mg     2.4     07-21

## 2017-07-22 NOTE — PROGRESS NOTE ADULT - ATTENDING COMMENTS
Continues to improve  + BM  Abd sl less distended non tender  WBC  WNL  Imp  Fecal impaction, slowly resolving  Plan  Continue present regimen Continues to improve  + BM  Abd sl less distended non tender than yessterday  WBC  WNL this am  Imp  Fecal impaction, slowly resolving  Plan  Continue present regimen

## 2017-07-23 NOTE — PROGRESS NOTE ADULT - SUBJECTIVE AND OBJECTIVE BOX
CHIEF COMPLAINT:Patient is a 89y old  Female who presents with a chief complaint of Vomiting (20 Jul 2017 04:27)  comfortable on bed  doing ok. not in any distress.  	          REVIEW OF SYSTEMS:  CONSTITUTIONAL: No fever, weight loss, or fatigue  EYES: No eye pain, visual disturbances, or discharge  NECK: No pain or stiffness  RESPIRATORY: No cough, wheezing, chills or hemoptysis; No Shortness of Breath  CARDIOVASCULAR: No chest pain, palpitations, passing out, dizziness, or leg swelling  GASTROINTESTINAL: No abdominal or epigastric pain. No nausea, vomiting, or hematemesis; No diarrhea or constipation. No melena or hematochezia.  GENITOURINARY: No dysuria, frequency, hematuria, or incontinence  NEUROLOGICAL: No headaches, memory loss, loss of strength, numbness, or tremors  SKIN: No itching, burning, rashes, or lesions   LYMPH Nodes: No enlarged glands  ENDOCRINE: No heat or cold intolerance; No hair loss  MUSCULOSKELETAL: No joint pain or swelling; No muscle, back, or extremity pain    Medications:  MEDICATIONS  (STANDING):  sodium chloride 0.9% Bolus 500 milliLiter(s) IV Bolus once  donepezil 5 milliGRAM(s) Oral at bedtime  memantine 10 milliGRAM(s) Oral daily  enalaprilat Injectable 1.25 milliGRAM(s) IV Push every 8 hours  pantoprazole  Injectable 40 milliGRAM(s) IV Push every 12 hours  dextrose 5% + sodium chloride 0.9%. 1000 milliLiter(s) (50 mL/Hr) IV Continuous <Continuous>  dextrose 5% + sodium chloride 0.45%. 1000 milliLiter(s) (50 mL/Hr) IV Continuous <Continuous>  metoprolol Injectable 5 milliGRAM(s) IV Push every 12 hours    MEDICATIONS  (PRN):    	    PHYSICAL EXAM:  T(C): 37.7 (07-23-17 @ 05:21), Max: 37.8 (07-22-17 @ 21:36)  HR: 79 (07-23-17 @ 05:21) (79 - 98)  BP: 169/85 (07-23-17 @ 05:21) (148/78 - 169/85)  RR: 16 (07-23-17 @ 05:21) (16 - 16)  SpO2: 94% (07-23-17 @ 05:21) (93% - 96%)  Wt(kg): --  I&O's Summary      Appearance: Normal	  HEENT:   Normal oral mucosa, PERRL, EOMI	  Lymphatic: No lymphadenopathy  Cardiovascular: Normal S1 S2, No JVD, No murmurs, No edema  Respiratory: Lungs clear to auscultation	  Psychiatry: A & O x 3, Mood & affect appropriate  Gastrointestinal:  Soft, Non-tender, + BS // suprapubic tenderness	  Skin: No rashes, No ecchymoses, No cyanosis	  Neurologic: Non-focal  Extremities: Normal range of motion, No clubbing, cyanosis or edema  Vascular: Peripheral pulses palpable 2+ bilaterally    TELEMETRY: 	    ECG:  	  RADIOLOGY:  OTHER: 	  	  LABS:	 	    CARDIAC MARKERS:                                13.6   7.3   )-----------( 144      ( 23 Jul 2017 10:27 )             41.0     07-23    151<H>  |  118<H>  |  20<H>  ----------------------------<  133<H>  3.6   |  25  |  0.82    Ca    8.8      23 Jul 2017 10:27      proBNP:   Lipid Profile:   HgA1c:   TSH:

## 2017-07-23 NOTE — PROGRESS NOTE ADULT - SUBJECTIVE AND OBJECTIVE BOX
INTERVAL HPI/OVERNIGHT EVENTS:    Pt seen and examined at bedside. Nonverbal, no acute events overnight. Pt has multiple BMs after enema as per nursing staff.     Vital Signs Last 24 Hrs  T(C): 37.7 (23 Jul 2017 05:21), Max: 37.8 (22 Jul 2017 21:36)  T(F): 99.8 (23 Jul 2017 05:21), Max: 100.1 (22 Jul 2017 21:36)  HR: 79 (23 Jul 2017 05:21) (79 - 98)  BP: 169/85 (23 Jul 2017 05:21) (148/78 - 169/85)  BP(mean): --  RR: 16 (23 Jul 2017 05:21) (16 - 16)  SpO2: 94% (23 Jul 2017 05:21) (93% - 96%)  I&O's Detail    pantoprazole  Injectable 40 milliGRAM(s) IV Push every 12 hours      Physical Exam  Abdomen: soft, mildly distended, nontender, no palpable masses  Extremities: non edematous, no calf pain bilaterally        Labs:                        13.4   8.5   )-----------( 125      ( 22 Jul 2017 06:41 )             41.6     07-21    145  |  115<H>  |  27<H>  ----------------------------<  123<H>  4.4   |  23  |  0.86    Ca    9.0      21 Jul 2017 10:48  Phos  2.5     07-21  Mg     2.4     07-21

## 2017-07-23 NOTE — CHART NOTE - NSCHARTNOTEFT_GEN_A_CORE
patient was scanned for urinary retention. Urinary volume is 0 cc. Discontinued Lockett's Catheter order. Discussed with RN.

## 2017-07-24 NOTE — PROGRESS NOTE ADULT - SUBJECTIVE AND OBJECTIVE BOX
Patient is a 89y old  Female who presents with a chief complaint of Vomiting (20 Jul 2017 04:27)      INTERVAL HPI/OVERNIGHT EVENTS: no new complaints    REVIEW OF SYSTEMS:  Unable to obtain as patient non -verbal       Vital Signs Last 24 Hrs  T(C): 37.3 (24 Jul 2017 14:05), Max: 37.8 (24 Jul 2017 05:03)  T(F): 99.2 (24 Jul 2017 14:05), Max: 100 (24 Jul 2017 05:03)  HR: 92 (24 Jul 2017 14:05) (92 - 108)  BP: 121/61 (24 Jul 2017 14:05) (121/61 - 152/73)  BP(mean): --  RR: 17 (24 Jul 2017 14:05) (16 - 17)  SpO2: 92% (24 Jul 2017 14:05) (91% - 95%)    ________________________________________________  PHYSICAL EXAM:  GENERAL: NAD, lethargic   HEAD:  , Normocephalic  EYES:  conjunctiva and sclera clear  NECK: Supple, No JVD    NERVOUS SYSTEM:  Alert & Oriented X3,   CHEST/LUNG: Clear to auscultation bilaterally;   HEART: S1 S2+;   ABDOMEN: Nontender, moderately distended; decreased bowel sounds   EXTREMITIES: no cyanosis; no edema; no calf tenderness    _________________________________________________  LABS:                        14.0   5.8   )-----------( 148      ( 24 Jul 2017 06:43 )             43.2     07-24    149<H>  |  117<H>  |  31<H>  ----------------------------<  103<H>  4.5   |  22  |  1.27    Ca    9.1      24 Jul 2017 06:43           Xray Abdomen 1 View PORTABLE -Urgent (07.24.17 @ 10:41) >  Redemonstration of air dilated loops of small bowel, which may represent   ileus versus obstruction, similar to the prior study. Moderate amount of   stool projects over the distal colon and rectum. No gross supine evidence   for free air.

## 2017-07-24 NOTE — PROGRESS NOTE ADULT - PROBLEM SELECTOR PLAN 2
1. Continue enemas  2. Patient needs to be manually disimpacted daily.  3. No indication for rectal tube.  4. GI follow up

## 2017-07-24 NOTE — PROGRESS NOTE ADULT - PROBLEM SELECTOR PLAN 1
Surgery Service Dr. Le following pt. Appreciate advice.   NGT was attempted both by medical and surgical team but unsuccessful.  Rec daily Xray-abd/pelvis, enemas for disimpaction, NPO, f/u GI  Xray abd/pelvis from AM shows persistent SBO, not improved since CT abd from yesterday... follow up with repeat xray in PM.    GI Service Dr. Kulkarni following pt. Appreciate advice.   likely stress induced gastropathy... keep NPO  IV protonix push  Monitor CBC... no procedure at this time Surgery Service Dr. Le following pt. Appreciate advice.   Rec daily Xray-abd/pelvis, enemas for disimpaction, NPO  Xray abd/pelvis from AM shows dilated bowels consistent with SBO, not significantly changed from yesterday... will repeat tomorrow    GI Service Dr. Kulkarni following pt. Appreciate advice.   gastropathy - c/w IV protonix 40mg BID    rectal tube not advised by surgery team  NGT was attempted twice last week with no success

## 2017-07-24 NOTE — SWALLOW BEDSIDE ASSESSMENT ADULT - SLP PERTINENT HISTORY OF CURRENT PROBLEM
89yo F with pmhx of HTN, hyperlipidemia, parkison's, and gastritis came from nursing home after 3 episodes of emesis. She c/o LLQ pain which is crampy. Denies fevers, chills, or nausea. Denies chest pain or SOB.

## 2017-07-24 NOTE — PROVIDER CONTACT NOTE (OTHER) - ASSESSMENT
FOUND TO BE TACHYPNEA, RR 44 BY COUNT; V/S DONE: T 100.7, /83, , RR 44, AND O2 SATURATION 90% ON 2 LC. CONTACTED PGY1 RONI, PGY2 TIM, PGY3 JOSE GUADALUPE MADE AWARE.

## 2017-07-24 NOTE — PROGRESS NOTE ADULT - SUBJECTIVE AND OBJECTIVE BOX
PGY 1 Note discussed with supervising resident and primary attending    Patient is a 89y old  Female who presents with a chief complaint of Vomiting (20 Jul 2017 04:27)      INTERVAL HPI/OVERNIGHT EVENTS: Pt was seen and examined at bedside. 2 BM yesterday after 2 enemas     MEDICATIONS  (STANDING):  donepezil 5 milliGRAM(s) Oral at bedtime  memantine 10 milliGRAM(s) Oral daily  enalaprilat Injectable 1.25 milliGRAM(s) IV Push every 8 hours  pantoprazole  Injectable 40 milliGRAM(s) IV Push every 12 hours  dextrose 5% + sodium chloride 0.45%. 1000 milliLiter(s) (50 mL/Hr) IV Continuous <Continuous>  metoprolol Injectable 5 milliGRAM(s) IV Push every 12 hours    MEDICATIONS  (PRN):      Vital Signs Last 24 Hrs  T(C): 37.3 (24 Jul 2017 14:05), Max: 37.8 (24 Jul 2017 05:03)  T(F): 99.2 (24 Jul 2017 14:05), Max: 100 (24 Jul 2017 05:03)  HR: 92 (24 Jul 2017 14:05) (92 - 108)  BP: 121/61 (24 Jul 2017 14:05) (121/61 - 152/73)  BP(mean): --  RR: 17 (24 Jul 2017 14:05) (16 - 17)  SpO2: 92% (24 Jul 2017 14:05) (91% - 95%)    ________________________________________________  PHYSICAL EXAM:  GENERAL: NAD. Cachetic  HEENT: Normocephalic;  conjunctivae and sclerae clear; moist mucous membranes;   NECK : supple  CHEST/LUNG: Clear to auscultation bilaterally with good air entry   HEART: S1 S2  regular; no murmurs, gallops or rubs  ABDOMEN: Distended, decreased bowel sound all quadrants, nontender  EXTREMITIES: no cyanosis; no edema; no calf tenderness; peripheral pulses intact  SKIN: warm and dry; no rash  NERVOUS SYSTEM:  Awake and alert    _________________________________________________  LABS:                        14.0   5.8   )-----------( 148      ( 24 Jul 2017 06:43 )             43.2     07-24    149<H>  |  117<H>  |  31<H>  ----------------------------<  103<H>  4.5   |  22  |  1.27    Ca    9.1      24 Jul 2017 06:43        RADIOLOGY & ADDITIONAL TESTS:  Xray Abd - FINDINGS:  Redemonstration of air dilated loops of small bowel, which may represent   ileus versus obstruction, similar to the prior study. Moderate amount of   stool projects over the distal colon and rectum. No gross supine evidence   for free air.    IMPRESSION:  Nonspecific bowel gas pattern.    Imaging Personally Reviewed:  YES    Consultant(s) Notes Reviewed:   YES    Care Discussed with PGY-2/Attending/Consultants :  YES    Plan of care was discussed with patient and /or primary care giver; all questions and concerns were addressed and care was aligned with patient's wishes.

## 2017-07-24 NOTE — CHART NOTE - NSCHARTNOTEFT_GEN_A_CORE
Paged by RN that pt is in Resp distress. Seen and examined pt. T- 100.5 f, HR 130s, RR-40s, SPO2 98% on 100% NRB mask. EKG Sinus tachy @130 with RBBB. Got labs, d dimer elevated 41193 leukocytosis 13 with lactate 3.3. gave 500 cc NS bolus. Will get CT chest no contrast as suspected pneumonia and started on Zosyn and 1 dose vanco, no CTA as creatinine elevated and pt came with coffee ground emesis and does not qualify for anticoagulation thou PE could be a possibility. Consider V/Q scan in AM. Spoke to daughter and she signed DNR/ DNI. Attending informed. Will reassess. f/u blood cx.

## 2017-07-24 NOTE — PROGRESS NOTE ADULT - ATTENDING COMMENTS
As above  Pt manually disimpacted with some decrease in abdominal distention    Continue with enemas

## 2017-07-24 NOTE — SWALLOW BEDSIDE ASSESSMENT ADULT - ASR SWALLOW LINGUAL MOBILITY
impaired protrusion/impaired anterior elevation/unable to fully assess 2/2 poor participation/impaired right lateral movement/impaired left lateral movement

## 2017-07-24 NOTE — SWALLOW BEDSIDE ASSESSMENT ADULT - MODE OF PRESENTATION
fed by clinician/x1 fed by clinician/cup/spoon/x6 fed by clinician/x2/spoon fed by clinician/x4/spoon

## 2017-07-24 NOTE — PROGRESS NOTE ADULT - PROBLEM SELECTOR PLAN 1
- Abdominal distention, decreased bowel sounds , recommend NGT , if unable to place that consider rectal tube    - Surgical f/up   - AXR noted, continue with the serial abdominal exams and Xray

## 2017-07-24 NOTE — SWALLOW BEDSIDE ASSESSMENT ADULT - SWALLOW EVAL: RECOMMENDED FEEDING/EATING TECHNIQUES
position upright (90 degrees)/check mouth frequently for oral residue/pocketing/no straws/small sips/bites/crush medication (when feasible)/oral hygiene/allow for swallow between intakes/maintain upright posture during/after eating for 30 mins/alternate food with liquid

## 2017-07-24 NOTE — SWALLOW BEDSIDE ASSESSMENT ADULT - SWALLOW EVAL: DIAGNOSIS
Pt p/w oropharyngeal dysphagia, c/b weak labial seal, poor stripping, anterior spillage, reduced AP transport and oral transit time, and delayed initiation of swallow. Pt p/w cough following trials of nectar thick liquids.

## 2017-07-24 NOTE — SWALLOW BEDSIDE ASSESSMENT ADULT - ORAL PREPARATORY PHASE
Within functional limits Anterior loss of bolus/weak labial seal; poor stripping of bolus weak labial seal; poor stripping of bolus/Anterior loss of bolus weak labial seal; poor stripping/Bolus falls into anterior sulcus/Reduced oral grading

## 2017-07-24 NOTE — PROGRESS NOTE ADULT - ATTENDING COMMENTS
Patient seen and examined, above noted, d/w resident and patients daughter at bedside in detail  discharge planning.  Also last 2 days coverage note reviewed, d/w covering MD.

## 2017-07-24 NOTE — SWALLOW BEDSIDE ASSESSMENT ADULT - COMMENTS
Pt received supine in bed, HOB elevated to 60 degrees, alert but minimally verbal and eyes closed. Pt speaks Pashto; [facility]  #[id number] used to conduct exam in Pashto. When liquids administered via cup, majority of bolus was lost anteriorly. Pt retained more of the bolus when given liquids via spoon. Pt lost majority of bolus anteriorly and coughed s/p swallow of the remaining bolus Pt unable to strip large bolus from spoon; small bites taken. Pt unable to follow directions to clear oral residue, oral stasis noted.

## 2017-07-24 NOTE — PROGRESS NOTE ADULT - SUBJECTIVE AND OBJECTIVE BOX
Call by medical resident today to see patient and to place rectal tube as suggested by GI.  I saw the patient, she does not respond to questions.  As per the RN, she had two small bowel movements yesterday and has not responded to the enemas she was given.  Her abdomen is distended and tympanic and no pain was elicited.   I manually disimpacted the patient and removed a large amount of paste like stool and some gas was released.

## 2017-07-24 NOTE — PROGRESS NOTE ADULT - SUBJECTIVE AND OBJECTIVE BOX
INTERVAL HPI/OVERNIGHT EVENTS:  Pt resting comfortably. On NC. NPO. +BM's with enema.   Denies N/V    MEDICATIONS  (STANDING):  donepezil 5 milliGRAM(s) Oral at bedtime  memantine 10 milliGRAM(s) Oral daily  enalaprilat Injectable 1.25 milliGRAM(s) IV Push every 8 hours  pantoprazole  Injectable 40 milliGRAM(s) IV Push every 12 hours  dextrose 5% + sodium chloride 0.45%. 1000 milliLiter(s) (50 mL/Hr) IV Continuous <Continuous>  metoprolol Injectable 5 milliGRAM(s) IV Push every 12 hours    MEDICATIONS  (PRN):      Vital Signs Last 24 Hrs  T(C): 37.6 (24 Jul 2017 05:49), Max: 37.8 (24 Jul 2017 05:03)  T(F): 99.6 (24 Jul 2017 05:49), Max: 100 (24 Jul 2017 05:03)  HR: 95 (24 Jul 2017 05:49) (91 - 108)  BP: 133/76 (24 Jul 2017 05:49) (131/69 - 152/73)  BP(mean): --  RR: 17 (24 Jul 2017 05:49) (16 - 17)  SpO2: 95% (24 Jul 2017 05:49) (91% - 95%)    Physical:  General: NAD.  Abdomen: Soft distended, no tendernes elicited    LABS:                        14.0   5.8   )-----------( 148      ( 24 Jul 2017 06:43 )             43.2             07-24    149<H>  |  117<H>  |  31<H>  ----------------------------<  103<H>  4.5   |  22  |  1.27    Ca    9.1      24 Jul 2017 06:43

## 2017-07-24 NOTE — PROGRESS NOTE ADULT - ASSESSMENT
89 F w/ PMH HTN, HLD came in from [facility] after 3 episodes of vomiting (coffee ground emesis) and c/o LLQ pain. Pt was admitted for SBO. CT scan was significant for persistent small bowel ileus versus partial small bowel obstruction.

## 2017-07-24 NOTE — SWALLOW BEDSIDE ASSESSMENT ADULT - ORAL PHASE
Decreased anterior-posterior movement of the bolus/Delayed oral transit time Delayed oral transit time/Decreased anterior-posterior movement of the bolus/Palatal stasis/oral stasis noted/Lingual stasis

## 2017-07-24 NOTE — PROGRESS NOTE ADULT - ASSESSMENT
89 Y/O F, from [place], pmhx of HTN, HLD, demntia, gastritis sent from NH after 3 episodes of vomiting and complaining of left lower quadrant pain. Admitted for ileus, stress induced gastropathy , fecal impaction

## 2017-07-25 NOTE — PROVIDER CONTACT NOTE (OTHER) - ACTION/TREATMENT ORDERED:
no intervention
NO TREATMENT OR ACTION ORDERED. CONTINUE TO MONITOR.
SEPSIS WORKUP, BLOOD WORK INCLUDING BLOOD CULTURES X2, PTT/PT/INR/DDIMER, CBC, CMP, LACTATE. PLACED ON NRB. EKG DONE. 500ML NS BOLUS TO BE GIVEN.

## 2017-07-25 NOTE — PROVIDER CONTACT NOTE (OTHER) - BACKGROUND
RN CALLED TO ROOM BY FAMILY MEMBER AT THE BEDSIDE, NOTICED PT WAS BREATHING FAST. FOUND TO BE RR 44 BY COUNT; V/S DONE: T 100.7, /83, , RR 44, AND O2 SATURATION 90% ON 2 LC.
PT HERE FOR DEHYDRATION, N/V, FECAL IMPACTION. DEVELOPED S/S OF SEPSIS EARLIER IN SHIFT @ APPROXIMATELY 2020. ALREADY RECEIVED 500ML NS BOLUS, VANCOMYCIN 1G, ZOSYN 3.325G, TYLENOL 650 SUPPOSITORY, ETC

## 2017-07-25 NOTE — DISCHARGE NOTE FOR THE EXPIRED PATIENT - HOSPITAL COURSE
2 F w/ PMH HTN, HLD came in from NH after 3 episodes of vomiting (coffee ground emesis) and c/o LLQ pain. Pt was admitted for SBO. CT scan of abdomen was significant for persistent small bowel ileus versus partial small bowel obstruction. Pt was treated with protonix, received bowel disimpaction and multiple enemas with subsequent BMs. Home medications for HTN, Dementia were continued. During the admission, pt experienced MIKHAIL which resolved. On 7/25 Pt was tachypneic around 2100. Spoke to family, who signed DNR/DNI at bedside. CT scan was obtained of the chest. ABG, CBC CMP lactate blood cultures all drawn. Pt started on Vanc and Zosyn due to suspected sepsis.  At 0250. Pt found to have vomited - vomitus appeared foul smelling fecal matter  Pupils were fixed and dilated.  No peripheral pulses palpable  No heart or breath sounds detectable.  Time of death 2:54 on 7/25/2017  Attending informed  Family denied autopsy.

## 2017-07-25 NOTE — PROVIDER CONTACT NOTE (OTHER) - ASSESSMENT
PT ASSESSMENT UNCHANGED FROM PREVIOUSLY BUT HAS IMPROVEMENT IN TEMPERATURE FROM 102.6, HR LOWERED FROM 140S , AND RR DECLINED FROM 58 TO 44. APPEARS TO BE IN NO ACUTE DISTRESS.

## 2017-07-25 NOTE — DISCHARGE NOTE FOR THE EXPIRED PATIENT - OTHER SIGNIFICANT FINDINGS
Organ Donation notified of patient's expiration. Due to Patient's age she is not  suitable  for donation however patient will be referred to the brain bank for study.

## 2017-07-27 DIAGNOSIS — K56.69 OTHER INTESTINAL OBSTRUCTION: ICD-10-CM

## 2017-07-27 DIAGNOSIS — K56.41 FECAL IMPACTION: ICD-10-CM

## 2017-07-27 DIAGNOSIS — E78.5 HYPERLIPIDEMIA, UNSPECIFIED: ICD-10-CM

## 2017-07-27 DIAGNOSIS — D64.9 ANEMIA, UNSPECIFIED: ICD-10-CM

## 2017-07-27 DIAGNOSIS — K31.9 DISEASE OF STOMACH AND DUODENUM, UNSPECIFIED: ICD-10-CM

## 2017-07-27 DIAGNOSIS — E43 UNSPECIFIED SEVERE PROTEIN-CALORIE MALNUTRITION: ICD-10-CM

## 2017-07-27 DIAGNOSIS — I10 ESSENTIAL (PRIMARY) HYPERTENSION: ICD-10-CM

## 2017-07-27 DIAGNOSIS — Z66 DO NOT RESUSCITATE: ICD-10-CM

## 2017-07-27 DIAGNOSIS — N17.9 ACUTE KIDNEY FAILURE, UNSPECIFIED: ICD-10-CM

## 2017-07-27 DIAGNOSIS — K21.9 GASTRO-ESOPHAGEAL REFLUX DISEASE WITHOUT ESOPHAGITIS: ICD-10-CM

## 2017-07-27 DIAGNOSIS — F03.90 UNSPECIFIED DEMENTIA, UNSPECIFIED SEVERITY, WITHOUT BEHAVIORAL DISTURBANCE, PSYCHOTIC DISTURBANCE, MOOD DISTURBANCE, AND ANXIETY: ICD-10-CM

## 2017-07-27 DIAGNOSIS — G20 PARKINSON'S DISEASE: ICD-10-CM

## 2017-07-27 DIAGNOSIS — K56.7 ILEUS, UNSPECIFIED: ICD-10-CM

## 2017-07-30 DIAGNOSIS — J96.90 RESPIRATORY FAILURE, UNSPECIFIED, UNSPECIFIED WHETHER WITH HYPOXIA OR HYPERCAPNIA: ICD-10-CM

## 2017-07-30 DIAGNOSIS — A41.9 SEPSIS, UNSPECIFIED ORGANISM: ICD-10-CM

## 2017-07-30 LAB
CULTURE RESULTS: SIGNIFICANT CHANGE UP
CULTURE RESULTS: SIGNIFICANT CHANGE UP
SPECIMEN SOURCE: SIGNIFICANT CHANGE UP
SPECIMEN SOURCE: SIGNIFICANT CHANGE UP

## 2017-09-16 NOTE — ED PROVIDER NOTE - OBJECTIVE STATEMENT
89F HTN, HL, Parkinson's, gastritis, no anticoagulants, lives at [facility], here after 3 episodes of brownish emesis. Complains of left lower abd pain, crampy. No diarrhea, dysuria, hematuria or frequency. AOx3, Slovenian speaking, family at bedside. DNR, DNI, HCP Aretha Olvera
moderate assist (50% patients effort)

## 2018-05-04 NOTE — ED ADULT NURSE NOTE - PMH
Review of Systems/Medical History  Patient summary reviewed        Cardiovascular  Hypertension ,    Pulmonary       GI/Hepatic            Endo/Other  Diabetes ,      GYN       Hematology   Musculoskeletal    Arthritis     Neurology   Psychology           Physical Exam    Airway    Mallampati score: III  TM Distance: >3 FB  Neck ROM: full     Dental       Cardiovascular      Pulmonary      Other Findings        Anesthesia Plan  ASA Score- 2     Anesthesia Type- IV sedation with anesthesia with ASA Monitors  Additional Monitors:   Airway Plan:         Plan Factors-    Induction- intravenous  Postoperative Plan-     Informed Consent- Anesthetic plan and risks discussed with patient  Anemia    Dementia    Gastro-esophageal reflux disease without esophagitis    History of hypertension

## 2025-03-20 NOTE — PATIENT PROFILE ADULT. - AS SC BRADEN SENSORY
Problem: Pain  Goal: Acceptable pain level achieved/maintained at rest using appropriate pain scale for the patient  Outcome: Monitoring/Evaluating progress     Problem: Pain  Goal: Acceptable pain level achieved/maintained with activity using appropriate pain scale for the patient  Outcome: Monitoring/Evaluating progress     Problem: Postoperative Care  Goal: Vital signs are maintained within parameters  Outcome: Monitoring/Evaluating progress     Problem: Postoperative Care  Goal: Oral intake is resumed and tolerated  Outcome: Monitoring/Evaluating progress     Problem: Breathing Pattern Ineffective  Goal: Air exchange is effective, demonstrated by Sp02 sat of greater then or = 92% (or as ordered)  Outcome: Monitoring/Evaluating progress      (3) slightly limited